# Patient Record
Sex: MALE | Race: WHITE | NOT HISPANIC OR LATINO
[De-identification: names, ages, dates, MRNs, and addresses within clinical notes are randomized per-mention and may not be internally consistent; named-entity substitution may affect disease eponyms.]

---

## 2020-11-29 ENCOUNTER — TRANSCRIPTION ENCOUNTER (OUTPATIENT)
Age: 40
End: 2020-11-29

## 2021-01-20 ENCOUNTER — TRANSCRIPTION ENCOUNTER (OUTPATIENT)
Age: 41
End: 2021-01-20

## 2021-03-15 ENCOUNTER — EMERGENCY (EMERGENCY)
Facility: HOSPITAL | Age: 41
LOS: 1 days | Discharge: ANOTHER HOSPITAL | End: 2021-03-15
Attending: EMERGENCY MEDICINE | Admitting: EMERGENCY MEDICINE
Payer: COMMERCIAL

## 2021-03-15 ENCOUNTER — INPATIENT (INPATIENT)
Facility: HOSPITAL | Age: 41
LOS: 4 days | Discharge: HOME | DRG: 391 | End: 2021-03-20
Attending: INTERNAL MEDICINE | Admitting: INTERNAL MEDICINE
Payer: COMMERCIAL

## 2021-03-15 VITALS
SYSTOLIC BLOOD PRESSURE: 131 MMHG | HEART RATE: 86 BPM | OXYGEN SATURATION: 99 % | TEMPERATURE: 99 F | DIASTOLIC BLOOD PRESSURE: 84 MMHG | RESPIRATION RATE: 14 BRPM

## 2021-03-15 VITALS
RESPIRATION RATE: 18 BRPM | SYSTOLIC BLOOD PRESSURE: 156 MMHG | TEMPERATURE: 97 F | DIASTOLIC BLOOD PRESSURE: 94 MMHG | OXYGEN SATURATION: 96 %

## 2021-03-15 VITALS
HEART RATE: 110 BPM | RESPIRATION RATE: 18 BRPM | SYSTOLIC BLOOD PRESSURE: 127 MMHG | DIASTOLIC BLOOD PRESSURE: 95 MMHG | WEIGHT: 229.94 LBS | TEMPERATURE: 99 F | OXYGEN SATURATION: 96 %

## 2021-03-15 DIAGNOSIS — Z98.890 OTHER SPECIFIED POSTPROCEDURAL STATES: Chronic | ICD-10-CM

## 2021-03-15 DIAGNOSIS — K57.92 DIVERTICULITIS OF INTESTINE, PART UNSPECIFIED, WITHOUT PERFORATION OR ABSCESS WITHOUT BLEEDING: ICD-10-CM

## 2021-03-15 LAB
ALBUMIN SERPL ELPH-MCNC: 4.2 G/DL — SIGNIFICANT CHANGE UP (ref 3.3–5)
ALP SERPL-CCNC: 78 U/L — SIGNIFICANT CHANGE UP (ref 40–120)
ALT FLD-CCNC: 62 U/L — SIGNIFICANT CHANGE UP (ref 12–78)
ANION GAP SERPL CALC-SCNC: 5 MMOL/L — SIGNIFICANT CHANGE UP (ref 5–17)
APPEARANCE UR: ABNORMAL
AST SERPL-CCNC: 22 U/L — SIGNIFICANT CHANGE UP (ref 15–37)
BACTERIA # UR AUTO: ABNORMAL
BASOPHILS # BLD AUTO: 0.06 K/UL — SIGNIFICANT CHANGE UP (ref 0–0.2)
BASOPHILS NFR BLD AUTO: 0.3 % — SIGNIFICANT CHANGE UP (ref 0–2)
BILIRUB SERPL-MCNC: 1.3 MG/DL — HIGH (ref 0.2–1.2)
BILIRUB UR-MCNC: NEGATIVE — SIGNIFICANT CHANGE UP
BUN SERPL-MCNC: 21 MG/DL — SIGNIFICANT CHANGE UP (ref 7–23)
CALCIUM SERPL-MCNC: 9.4 MG/DL — SIGNIFICANT CHANGE UP (ref 8.5–10.1)
CHLORIDE SERPL-SCNC: 105 MMOL/L — SIGNIFICANT CHANGE UP (ref 96–108)
CO2 SERPL-SCNC: 27 MMOL/L — SIGNIFICANT CHANGE UP (ref 22–31)
COLOR SPEC: YELLOW — SIGNIFICANT CHANGE UP
CREAT SERPL-MCNC: 1.2 MG/DL — SIGNIFICANT CHANGE UP (ref 0.5–1.3)
DIFF PNL FLD: ABNORMAL
EOSINOPHIL # BLD AUTO: 0.01 K/UL — SIGNIFICANT CHANGE UP (ref 0–0.5)
EOSINOPHIL NFR BLD AUTO: 0 % — SIGNIFICANT CHANGE UP (ref 0–6)
EPI CELLS # UR: SIGNIFICANT CHANGE UP
GLUCOSE SERPL-MCNC: 118 MG/DL — HIGH (ref 70–99)
GLUCOSE UR QL: NEGATIVE — SIGNIFICANT CHANGE UP
HCT VFR BLD CALC: 46.1 % — SIGNIFICANT CHANGE UP (ref 39–50)
HGB BLD-MCNC: 16.6 G/DL — SIGNIFICANT CHANGE UP (ref 13–17)
IMM GRANULOCYTES NFR BLD AUTO: 0.4 % — SIGNIFICANT CHANGE UP (ref 0–1.5)
KETONES UR-MCNC: NEGATIVE — SIGNIFICANT CHANGE UP
LACTATE SERPL-SCNC: 1.5 MMOL/L — SIGNIFICANT CHANGE UP (ref 0.7–2)
LEUKOCYTE ESTERASE UR-ACNC: NEGATIVE — SIGNIFICANT CHANGE UP
LIDOCAIN IGE QN: 998 U/L — HIGH (ref 73–393)
LYMPHOCYTES # BLD AUTO: 10.4 % — LOW (ref 13–44)
LYMPHOCYTES # BLD AUTO: 2.09 K/UL — SIGNIFICANT CHANGE UP (ref 1–3.3)
MCHC RBC-ENTMCNC: 30.9 PG — SIGNIFICANT CHANGE UP (ref 27–34)
MCHC RBC-ENTMCNC: 36 GM/DL — SIGNIFICANT CHANGE UP (ref 32–36)
MCV RBC AUTO: 85.8 FL — SIGNIFICANT CHANGE UP (ref 80–100)
MONOCYTES # BLD AUTO: 2.04 K/UL — HIGH (ref 0–0.9)
MONOCYTES NFR BLD AUTO: 10.1 % — SIGNIFICANT CHANGE UP (ref 2–14)
NEUTROPHILS # BLD AUTO: 15.89 K/UL — HIGH (ref 1.8–7.4)
NEUTROPHILS NFR BLD AUTO: 78.8 % — HIGH (ref 43–77)
NITRITE UR-MCNC: NEGATIVE — SIGNIFICANT CHANGE UP
NRBC # BLD: 0 /100 WBCS — SIGNIFICANT CHANGE UP (ref 0–0)
PH UR: 6 — SIGNIFICANT CHANGE UP (ref 5–8)
PLATELET # BLD AUTO: 202 K/UL — SIGNIFICANT CHANGE UP (ref 150–400)
POTASSIUM SERPL-MCNC: 3.9 MMOL/L — SIGNIFICANT CHANGE UP (ref 3.5–5.3)
POTASSIUM SERPL-SCNC: 3.9 MMOL/L — SIGNIFICANT CHANGE UP (ref 3.5–5.3)
PROT SERPL-MCNC: 8.3 G/DL — SIGNIFICANT CHANGE UP (ref 6–8.3)
PROT UR-MCNC: 15
RBC # BLD: 5.37 M/UL — SIGNIFICANT CHANGE UP (ref 4.2–5.8)
RBC # FLD: 12.2 % — SIGNIFICANT CHANGE UP (ref 10.3–14.5)
RBC CASTS # UR COMP ASSIST: SIGNIFICANT CHANGE UP /HPF (ref 0–4)
SARS-COV-2 RNA SPEC QL NAA+PROBE: SIGNIFICANT CHANGE UP
SODIUM SERPL-SCNC: 137 MMOL/L — SIGNIFICANT CHANGE UP (ref 135–145)
SP GR SPEC: 1.02 — SIGNIFICANT CHANGE UP (ref 1.01–1.02)
UROBILINOGEN FLD QL: NEGATIVE — SIGNIFICANT CHANGE UP
WBC # BLD: 20.18 K/UL — HIGH (ref 3.8–10.5)
WBC # FLD AUTO: 20.18 K/UL — HIGH (ref 3.8–10.5)
WBC UR QL: SIGNIFICANT CHANGE UP

## 2021-03-15 PROCEDURE — 83690 ASSAY OF LIPASE: CPT

## 2021-03-15 PROCEDURE — 93010 ELECTROCARDIOGRAM REPORT: CPT

## 2021-03-15 PROCEDURE — G1004: CPT

## 2021-03-15 PROCEDURE — U0005: CPT

## 2021-03-15 PROCEDURE — 74177 CT ABD & PELVIS W/CONTRAST: CPT

## 2021-03-15 PROCEDURE — 80053 COMPREHEN METABOLIC PANEL: CPT

## 2021-03-15 PROCEDURE — 85025 COMPLETE CBC W/AUTO DIFF WBC: CPT

## 2021-03-15 PROCEDURE — 76870 US EXAM SCROTUM: CPT

## 2021-03-15 PROCEDURE — 36415 COLL VENOUS BLD VENIPUNCTURE: CPT

## 2021-03-15 PROCEDURE — 96365 THER/PROPH/DIAG IV INF INIT: CPT | Mod: XU

## 2021-03-15 PROCEDURE — 87086 URINE CULTURE/COLONY COUNT: CPT

## 2021-03-15 PROCEDURE — 83605 ASSAY OF LACTIC ACID: CPT

## 2021-03-15 PROCEDURE — 74177 CT ABD & PELVIS W/CONTRAST: CPT | Mod: 26,MG

## 2021-03-15 PROCEDURE — 81001 URINALYSIS AUTO W/SCOPE: CPT

## 2021-03-15 PROCEDURE — 96361 HYDRATE IV INFUSION ADD-ON: CPT

## 2021-03-15 PROCEDURE — 93975 VASCULAR STUDY: CPT | Mod: 26

## 2021-03-15 PROCEDURE — 93975 VASCULAR STUDY: CPT

## 2021-03-15 PROCEDURE — 99285 EMERGENCY DEPT VISIT HI MDM: CPT

## 2021-03-15 PROCEDURE — 76870 US EXAM SCROTUM: CPT | Mod: 26

## 2021-03-15 PROCEDURE — U0003: CPT

## 2021-03-15 PROCEDURE — 99285 EMERGENCY DEPT VISIT HI MDM: CPT | Mod: 25

## 2021-03-15 RX ORDER — ACETAMINOPHEN 500 MG
650 TABLET ORAL ONCE
Refills: 0 | Status: COMPLETED | OUTPATIENT
Start: 2021-03-15 | End: 2021-03-15

## 2021-03-15 RX ORDER — SODIUM CHLORIDE 9 MG/ML
1000 INJECTION INTRAMUSCULAR; INTRAVENOUS; SUBCUTANEOUS ONCE
Refills: 0 | Status: COMPLETED | OUTPATIENT
Start: 2021-03-15 | End: 2021-03-15

## 2021-03-15 RX ORDER — INFLUENZA VIRUS VACCINE 15; 15; 15; 15 UG/.5ML; UG/.5ML; UG/.5ML; UG/.5ML
0.5 SUSPENSION INTRAMUSCULAR ONCE
Refills: 0 | Status: DISCONTINUED | OUTPATIENT
Start: 2021-03-15 | End: 2021-03-20

## 2021-03-15 RX ORDER — PIPERACILLIN AND TAZOBACTAM 4; .5 G/20ML; G/20ML
3.38 INJECTION, POWDER, LYOPHILIZED, FOR SOLUTION INTRAVENOUS ONCE
Refills: 0 | Status: COMPLETED | OUTPATIENT
Start: 2021-03-15 | End: 2021-03-15

## 2021-03-15 RX ADMIN — PIPERACILLIN AND TAZOBACTAM 200 GRAM(S): 4; .5 INJECTION, POWDER, LYOPHILIZED, FOR SOLUTION INTRAVENOUS at 11:40

## 2021-03-15 RX ADMIN — PIPERACILLIN AND TAZOBACTAM 3.38 GRAM(S): 4; .5 INJECTION, POWDER, LYOPHILIZED, FOR SOLUTION INTRAVENOUS at 12:30

## 2021-03-15 RX ADMIN — SODIUM CHLORIDE 1000 MILLILITER(S): 9 INJECTION INTRAMUSCULAR; INTRAVENOUS; SUBCUTANEOUS at 09:06

## 2021-03-15 RX ADMIN — SODIUM CHLORIDE 1000 MILLILITER(S): 9 INJECTION INTRAMUSCULAR; INTRAVENOUS; SUBCUTANEOUS at 11:25

## 2021-03-15 RX ADMIN — Medication 650 MILLIGRAM(S): at 17:21

## 2021-03-15 NOTE — ED PROVIDER NOTE - OBJECTIVE STATEMENT
39 yo M p/w LLQ abd pain x past ~ 2 days. Pt states rad to L flank and to L testicle . No fever/chills. No v/d. No dysuria / hematuria. no neck / back pain. no recent trauma. no agg/allev factors. No other inj or co. No known covid exposures / prior infxn / immunization. No other inj or co.

## 2021-03-15 NOTE — ED ADULT NURSE NOTE - OBJECTIVE STATEMENT
Pt p/w LLQ abdominal pain radiating to groin x 1 day and nausea today. Pt reports heavy lifting believed it to be a strain but noted that pain was unrelenting and not improved w/ stretching. denies any change in bowel movement, no fever, or vomiting. Abdomen soft non tender. No change in urination.

## 2021-03-15 NOTE — ED ADULT NURSE REASSESSMENT NOTE - NSIMPLEMENTINTERV_GEN_ALL_ED
Implemented All Universal Safety Interventions:  [place] to call system. Call bell, personal items and telephone within reach. Instruct patient to call for assistance. Room bathroom lighting operational. Non-slip footwear when patient is off stretcher. Physically safe environment: no spills, clutter or unnecessary equipment. Stretcher in lowest position, wheels locked, appropriate side rails in place.

## 2021-03-15 NOTE — ED PROVIDER NOTE - PROGRESS NOTE DETAILS
Dw pt re need for admission, IV abx  Kvng Mclaughlin - will see pt to admit at [facility] pending COVID.

## 2021-03-15 NOTE — ED ADULT NURSE REASSESSMENT NOTE - NS ED NURSE REASSESS COMMENT FT1
received report from EDDIE Raman.  vss afebrile denies pain.  pt expressed feeling frustrated that he has been here all day.  pt hoped to have been in [place] by now.  awaiting transport to [place]. report given by prior shift,  plan to go ER to receiving unit.

## 2021-03-15 NOTE — ED PROVIDER NOTE - CHPI ED SYMPTOMS NEG
no abdominal distension/no blood in stool/no diarrhea/no dysuria/no fever/no vomiting/no burning urination/no chills

## 2021-03-15 NOTE — ED PROVIDER NOTE - GENITOURINARY, MLM
No discharge, lesions. nl cremesteric bl. Pos tend to L scrotal insertion - no bulges / hernias palpated. Nl penis.

## 2021-03-16 DIAGNOSIS — D72.829 ELEVATED WHITE BLOOD CELL COUNT, UNSPECIFIED: ICD-10-CM

## 2021-03-16 DIAGNOSIS — K57.32 DIVERTICULITIS OF LARGE INTESTINE WITHOUT PERFORATION OR ABSCESS WITHOUT BLEEDING: ICD-10-CM

## 2021-03-16 DIAGNOSIS — K85.90 ACUTE PANCREATITIS WITHOUT NECROSIS OR INFECTION, UNSPECIFIED: ICD-10-CM

## 2021-03-16 DIAGNOSIS — Z29.9 ENCOUNTER FOR PROPHYLACTIC MEASURES, UNSPECIFIED: ICD-10-CM

## 2021-03-16 DIAGNOSIS — E66.9 OBESITY, UNSPECIFIED: ICD-10-CM

## 2021-03-16 PROBLEM — N20.0 CALCULUS OF KIDNEY: Chronic | Status: ACTIVE | Noted: 2021-03-15

## 2021-03-16 LAB
AMYLASE P1 CFR SERPL: 94 U/L — SIGNIFICANT CHANGE UP (ref 25–125)
ANION GAP SERPL CALC-SCNC: 9 MMOL/L — SIGNIFICANT CHANGE UP (ref 5–17)
BASOPHILS # BLD AUTO: 0.03 K/UL — SIGNIFICANT CHANGE UP (ref 0–0.2)
BASOPHILS NFR BLD AUTO: 0.2 % — SIGNIFICANT CHANGE UP (ref 0–2)
BUN SERPL-MCNC: 20 MG/DL — SIGNIFICANT CHANGE UP (ref 7–23)
CALCIUM SERPL-MCNC: 9.3 MG/DL — SIGNIFICANT CHANGE UP (ref 8.4–10.5)
CHLORIDE SERPL-SCNC: 103 MMOL/L — SIGNIFICANT CHANGE UP (ref 96–108)
CO2 SERPL-SCNC: 26 MMOL/L — SIGNIFICANT CHANGE UP (ref 22–31)
CREAT SERPL-MCNC: 0.86 MG/DL — SIGNIFICANT CHANGE UP (ref 0.5–1.3)
CULTURE RESULTS: SIGNIFICANT CHANGE UP
EOSINOPHIL # BLD AUTO: 0.06 K/UL — SIGNIFICANT CHANGE UP (ref 0–0.5)
EOSINOPHIL NFR BLD AUTO: 0.4 % — SIGNIFICANT CHANGE UP (ref 0–6)
GLUCOSE SERPL-MCNC: 110 MG/DL — HIGH (ref 70–99)
HCT VFR BLD CALC: 41.3 % — SIGNIFICANT CHANGE UP (ref 39–50)
HGB BLD-MCNC: 14.4 G/DL — SIGNIFICANT CHANGE UP (ref 13–17)
IMM GRANULOCYTES NFR BLD AUTO: 0.5 % — SIGNIFICANT CHANGE UP (ref 0–1.5)
LIDOCAIN IGE QN: 804 U/L — HIGH (ref 73–393)
LYMPHOCYTES # BLD AUTO: 1.57 K/UL — SIGNIFICANT CHANGE UP (ref 1–3.3)
LYMPHOCYTES # BLD AUTO: 11.3 % — LOW (ref 13–44)
MCHC RBC-ENTMCNC: 30.7 PG — SIGNIFICANT CHANGE UP (ref 27–34)
MCHC RBC-ENTMCNC: 34.9 GM/DL — SIGNIFICANT CHANGE UP (ref 32–36)
MCV RBC AUTO: 88.1 FL — SIGNIFICANT CHANGE UP (ref 80–100)
MONOCYTES # BLD AUTO: 1.45 K/UL — HIGH (ref 0–0.9)
MONOCYTES NFR BLD AUTO: 10.4 % — SIGNIFICANT CHANGE UP (ref 2–14)
NEUTROPHILS # BLD AUTO: 10.72 K/UL — HIGH (ref 1.8–7.4)
NEUTROPHILS NFR BLD AUTO: 77.2 % — HIGH (ref 43–77)
NRBC # BLD: 0 /100 WBCS — SIGNIFICANT CHANGE UP (ref 0–0)
PLATELET # BLD AUTO: 163 K/UL — SIGNIFICANT CHANGE UP (ref 150–400)
POTASSIUM SERPL-MCNC: 3.6 MMOL/L — SIGNIFICANT CHANGE UP (ref 3.5–5.3)
POTASSIUM SERPL-SCNC: 3.6 MMOL/L — SIGNIFICANT CHANGE UP (ref 3.5–5.3)
RBC # BLD: 4.69 M/UL — SIGNIFICANT CHANGE UP (ref 4.2–5.8)
RBC # FLD: 12.3 % — SIGNIFICANT CHANGE UP (ref 10.3–14.5)
SARS-COV-2 IGG SERPL QL IA: NEGATIVE — SIGNIFICANT CHANGE UP
SARS-COV-2 IGM SERPL IA-ACNC: 0.07 INDEX — SIGNIFICANT CHANGE UP
SODIUM SERPL-SCNC: 138 MMOL/L — SIGNIFICANT CHANGE UP (ref 135–145)
SPECIMEN SOURCE: SIGNIFICANT CHANGE UP
WBC # BLD: 13.9 K/UL — HIGH (ref 3.8–10.5)
WBC # FLD AUTO: 13.9 K/UL — HIGH (ref 3.8–10.5)

## 2021-03-16 PROCEDURE — 99233 SBSQ HOSP IP/OBS HIGH 50: CPT

## 2021-03-16 PROCEDURE — 99223 1ST HOSP IP/OBS HIGH 75: CPT

## 2021-03-16 PROCEDURE — 93010 ELECTROCARDIOGRAM REPORT: CPT

## 2021-03-16 RX ORDER — ENOXAPARIN SODIUM 100 MG/ML
40 INJECTION SUBCUTANEOUS DAILY
Refills: 0 | Status: DISCONTINUED | OUTPATIENT
Start: 2021-03-16 | End: 2021-03-20

## 2021-03-16 RX ORDER — PIPERACILLIN AND TAZOBACTAM 4; .5 G/20ML; G/20ML
3.38 INJECTION, POWDER, LYOPHILIZED, FOR SOLUTION INTRAVENOUS EVERY 8 HOURS
Refills: 0 | Status: DISCONTINUED | OUTPATIENT
Start: 2021-03-16 | End: 2021-03-16

## 2021-03-16 RX ORDER — PIPERACILLIN AND TAZOBACTAM 4; .5 G/20ML; G/20ML
3.38 INJECTION, POWDER, LYOPHILIZED, FOR SOLUTION INTRAVENOUS EVERY 8 HOURS
Refills: 0 | Status: DISCONTINUED | OUTPATIENT
Start: 2021-03-16 | End: 2021-03-20

## 2021-03-16 RX ORDER — SODIUM CHLORIDE 9 MG/ML
1000 INJECTION, SOLUTION INTRAVENOUS ONCE
Refills: 0 | Status: COMPLETED | OUTPATIENT
Start: 2021-03-16 | End: 2021-03-16

## 2021-03-16 RX ORDER — SODIUM CHLORIDE 9 MG/ML
1000 INJECTION, SOLUTION INTRAVENOUS
Refills: 0 | Status: DISCONTINUED | OUTPATIENT
Start: 2021-03-16 | End: 2021-03-17

## 2021-03-16 RX ORDER — SODIUM CHLORIDE 9 MG/ML
1000 INJECTION, SOLUTION INTRAVENOUS
Refills: 0 | Status: DISCONTINUED | OUTPATIENT
Start: 2021-03-16 | End: 2021-03-16

## 2021-03-16 RX ORDER — ACETAMINOPHEN 500 MG
650 TABLET ORAL EVERY 6 HOURS
Refills: 0 | Status: DISCONTINUED | OUTPATIENT
Start: 2021-03-16 | End: 2021-03-20

## 2021-03-16 RX ORDER — PIPERACILLIN AND TAZOBACTAM 4; .5 G/20ML; G/20ML
3.38 INJECTION, POWDER, LYOPHILIZED, FOR SOLUTION INTRAVENOUS ONCE
Refills: 0 | Status: DISCONTINUED | OUTPATIENT
Start: 2021-03-16 | End: 2021-03-16

## 2021-03-16 RX ADMIN — PIPERACILLIN AND TAZOBACTAM 25 GRAM(S): 4; .5 INJECTION, POWDER, LYOPHILIZED, FOR SOLUTION INTRAVENOUS at 00:41

## 2021-03-16 RX ADMIN — SODIUM CHLORIDE 1000 MILLILITER(S): 9 INJECTION, SOLUTION INTRAVENOUS at 05:39

## 2021-03-16 RX ADMIN — SODIUM CHLORIDE 150 MILLILITER(S): 9 INJECTION, SOLUTION INTRAVENOUS at 06:46

## 2021-03-16 RX ADMIN — SODIUM CHLORIDE 150 MILLILITER(S): 9 INJECTION, SOLUTION INTRAVENOUS at 12:18

## 2021-03-16 RX ADMIN — Medication 650 MILLIGRAM(S): at 20:04

## 2021-03-16 RX ADMIN — SODIUM CHLORIDE 200 MILLILITER(S): 9 INJECTION, SOLUTION INTRAVENOUS at 23:41

## 2021-03-16 RX ADMIN — SODIUM CHLORIDE 200 MILLILITER(S): 9 INJECTION, SOLUTION INTRAVENOUS at 15:00

## 2021-03-16 RX ADMIN — PIPERACILLIN AND TAZOBACTAM 25 GRAM(S): 4; .5 INJECTION, POWDER, LYOPHILIZED, FOR SOLUTION INTRAVENOUS at 08:31

## 2021-03-16 RX ADMIN — SODIUM CHLORIDE 1000 MILLILITER(S): 9 INJECTION, SOLUTION INTRAVENOUS at 00:40

## 2021-03-16 RX ADMIN — PIPERACILLIN AND TAZOBACTAM 25 GRAM(S): 4; .5 INJECTION, POWDER, LYOPHILIZED, FOR SOLUTION INTRAVENOUS at 16:27

## 2021-03-16 RX ADMIN — PIPERACILLIN AND TAZOBACTAM 25 GRAM(S): 4; .5 INJECTION, POWDER, LYOPHILIZED, FOR SOLUTION INTRAVENOUS at 23:41

## 2021-03-16 RX ADMIN — ENOXAPARIN SODIUM 40 MILLIGRAM(S): 100 INJECTION SUBCUTANEOUS at 12:16

## 2021-03-16 RX ADMIN — Medication 650 MILLIGRAM(S): at 20:34

## 2021-03-16 NOTE — CHART NOTE - NSCHARTNOTEFT_GEN_A_CORE
Patient seen and examined at bedside. H and P reviewed. Agree with the above with the following addenda/modifications    Patient reports abdomen pain is improved still slightly uncomfortable  seen by GI    plan:  advance to full  c/w IV antibiotics Patient seen and examined at bedside. H and P reviewed. Agree with the above with the following addenda/modifications    Patient reports abdomen pain is improved still slightly uncomfortable  seen by GI    plan:  advance to full  c/w IV antibiotics      addenda  patient has fever 101 likely due to pancreatitis  check blood cx check urine cx    also noted episodes of sinus tachy. c/w monitoring

## 2021-03-16 NOTE — H&P ADULT - PROBLEM SELECTOR PLAN 2
ML related to the above, no fever, normal lactic acid level, was tachy only on triage,  received a total of 3000 ml IVF bolus, already on zosyn, monitor I & O, and trend TLC.

## 2021-03-16 NOTE — H&P ADULT - NSHPREVIEWOFSYSTEMS_GEN_ALL_CORE
-    CONSTITUTIONAL: No fever or chills.  EYES: No eye pain, visual disturbances, or discharge.  ENMT:  No difficulty hearing, vertigo, sinus or throat pain.  NECK: No pain or stiffness.	  RESPIRATORY: No cough, wheezing, or hemoptysis; No shortness of breath.  CARDIOVASCULAR: No chest pain, palpitations, dizziness, or leg swelling.  GASTROINTESTINAL: (+) abdominal pain, no nausea, vomiting, or hematemesis; No diarrhea or Change in bowel habits. No melena or hematochezia.  GENITOURINARY: No dysuria, frequency, hematuria, or incontinence.  NEUROLOGICAL: No headaches, focal muscle weakness, numbness, or tremors.  SKIN: No itching, burning or rashes.  MUSCULOSKELETAL: No joint swelling or pain.  PSYCHIATRIC: No depression, anxiety, or agitation.  HEME/LYMPH: No easy bruising, bleeding gums, or nose bleed.  ALLERGY AND IMMUNOLOGIC: No hives or eczema. -    CONSTITUTIONAL: No fever or chills.  EYES: No eye pain, visual disturbances, or discharge.  ENMT:  No difficulty hearing, vertigo, sinus or throat pain.  NECK: No pain or stiffness.	  RESPIRATORY: No cough, wheezing, or hemoptysis; No shortness of breath.  CARDIOVASCULAR: No chest pain, palpitations, dizziness, or leg swelling.  GASTROINTESTINAL: (+) abdominal pain but not currently, no nausea, vomiting, or hematemesis; No diarrhea or Change in bowel habits. No melena or hematochezia.  GENITOURINARY: No dysuria, frequency, hematuria, or incontinence.  NEUROLOGICAL: No headaches, focal muscle weakness, numbness, or tremors.  SKIN: No itching, burning or rashes.  MUSCULOSKELETAL: No joint swelling or pain.  PSYCHIATRIC: No depression, anxiety, or agitation.  HEME/LYMPH: No easy bruising, bleeding gums, or nose bleed.  ALLERGY AND IMMUNOLOGIC: No hives or eczema.

## 2021-03-16 NOTE — CONSULT NOTE ADULT - SUBJECTIVE AND OBJECTIVE BOX
[facility]    MD Wayne Bernabe MD Jaydeep Kadam, MD Faisal Sheikh, DO Kostas Sideridis, DO    Chief Complaint:  Patient is a 40y old  Male who presents with a chief complaint of LLQ abdominal pain. (16 Mar 2021 01:31)      HPI:  40-year-old male with no significant past medical history with 3 days of left lower quadrant abdominal pain, antecedent event was having multiple beers over the weekend.  Patient then developed left lower quadrant pulling sensation which he attributed secondary to lifting heavy boxes at his job.  Yesterday, pain became significantly worse and he came to the emergency room.    The patient denies any nausea/vomiting/chest pain/heartburn.  He denies having any diarrhea.  His last bowel movement was .  He denies any fevers/chills at home.    Patient was found to have leukocytosis of 20 on admission, CT abdomen pelvis demonstrates acute sigmoid diverticulitis.  Patient has never had a colonoscopy in the past.  He has never had an episode of diverticulitis in the past.  Family history significant for colon cancer in a maternal aunt.    Allergies:  No Known Allergies      Home Medications:    Hospital Medications:  enoxaparin Injectable 40 milliGRAM(s) SubCutaneous daily  influenza   Vaccine 0.5 milliLiter(s) IntraMuscular once  lactated ringers. 1000 milliLiter(s) IV Continuous <Continuous>  piperacillin/tazobactam IVPB.. 3.375 Gram(s) IV Intermittent every 8 hours      PMHX/PSHX:  Kidney stones    H/O hernia repair      Family history:  No pertinent family history in first degree relatives  Positive colon cancer in a maternal aunt      Social History:   Social alcohol use    ROS:     General:  No wt loss, fevers, chills, night sweats, fatigue,   Eyes:  Good vision, no reported pain  ENT:  No sore throat, pain, runny nose, dysphagia  CV:  No pain, palpitations, hypo/hypertension  Resp:  No dyspnea, cough, tachypnea, wheezing  GI:  See HPI  :  No pain, bleeding, incontinence, nocturia  Muscle:  No pain, weakness  Neuro:  No weakness, tingling, memory problems  Psych:  No fatigue, insomnia, mood problems, depression  Endocrine:  No polyuria, polydipsia, cold/heat intolerance  Heme:  No petechiae, ecchymosis, easy bruisability  Skin:  No rash, tattoos, scars, edema      PHYSICAL EXAM:     GENERAL:  Appears stated age, well-groomed, well-nourished, no distress  HEENT:  NC/AT,  conjunctivae clear and pink, no thyromegaly, nodules, adenopathy, no JVD, sclera -anicteric  CHEST:  Full & symmetric excursion, no increased effort, breath sounds clear  HEART:  Regular rhythm, S1, S2, no murmur/rub/S3/S4, no abdominal bruit, no edema  ABDOMEN:  Soft, Minimal tenderness to the left lower quadrant with deep palpation, no rebound, no guarding  EXT:  no cyanosis,clubbing or edema  SKIN:  No rash/erythema, intact   NEURO:  Alert, oriented, no asterixis, no tremor, no encephalopathy    Vital Signs:  Vital Signs Last 24 Hrs  T(C): 37.8 (16 Mar 2021 10:00), Max: 37.9 (15 Mar 2021 14:07)  T(F): 100 (16 Mar 2021 10:00), Max: 100.2 (15 Mar 2021 14:07)  HR: 95 (16 Mar 2021 10:00) (81 - 99)  BP: 133/91 (16 Mar 2021 10:00) (112/69 - 156/94)  BP(mean): --  RR: 17 (16 Mar 2021 10:00) (14 - 19)  SpO2: 95% (16 Mar 2021 10:00) (95% - 99%)  Daily     Daily     LABS:                        14.4   13.90 )-----------( 163      ( 16 Mar 2021 07:57 )             41.3     Mean Cell Volume: 88.1 fl (21 @ 07:57)    -    138  |  103  |  20  ----------------------------<  110<H>  3.6   |  26  |  0.86    Ca    9.3      16 Mar 2021 07:57    TPro  8.3  /  Alb  4.2  /  TBili  1.3<H>  /  DBili  x   /  AST  22  /  ALT  62  /  AlkPhos  78  03-15    LIVER FUNCTIONS - ( 15 Mar 2021 09:12 )  Alb: 4.2 g/dL / Pro: 8.3 g/dL / ALK PHOS: 78 U/L / ALT: 62 U/L / AST: 22 U/L / GGT: x             Urinalysis Basic - ( 15 Mar 2021 09:23 )    Color: Yellow / Appearance: Slightly Turbid / S.020 / pH: x  Gluc: x / Ketone: Negative  / Bili: Negative / Urobili: Negative   Blood: x / Protein: 15 / Nitrite: Negative   Leuk Esterase: Negative / RBC: 0-2 /HPF / WBC 0-2   Sq Epi: x / Non Sq Epi: Occasional / Bacteria: Occasional      Amylase [id number]      Lipase [id number]       Ammonia--                          14.4   13.90 )-----------( 163      ( 16 Mar 2021 07:57 )             41.3                         16.6   20.18 )-----------( 202      ( 15 Mar 2021 09:12 )             46.1       Imaging:

## 2021-03-16 NOTE — H&P ADULT - PROBLEM SELECTOR PLAN 3
possibly resolving given the mild elevation, unclear cause, trend lipase, GI consult as stated above.

## 2021-03-16 NOTE — H&P ADULT - HISTORY OF PRESENT ILLNESS
This is a 41 y/o M with PMH of Nephrolithiasis s/p Lithotripsy who presented with 2/10 vague LLQ abdominal pain, started on Friday, radiating to his left testicle and up to his left loin, no associated nausea vomiting, or diarrhea, no reported blood in stool. Patient was concerned despite mild nature of symptoms as he thought there is another kidney stone, so came to the ED. CT abdomen with IV contrast showed evidence of acute sigmoid diverticulitis. Denies any fever or chills. This is a 39 y/o M with PMH of Nephrolithiasis s/p Lithotripsy and Obesity who presented with 2/10 vague LLQ abdominal pain, started on Friday, radiating to his left testicle and up to his left loin, no associated nausea vomiting, or diarrhea, no reported blood in stool. Patient was concerned despite mild nature of symptoms as he thought there is another kidney stone, so came to the ED. CT abdomen with IV contrast showed evidence of acute sigmoid diverticulitis. Denies any fever or chills.

## 2021-03-16 NOTE — H&P ADULT - NSHPPHYSICALEXAM_GEN_ALL_CORE
-    Vital Signs Last 24 Hrs  T(C): 37.1 (16 Mar 2021 01:33), Max: 37.9 (15 Mar 2021 14:07)  T(F): 98.8 (16 Mar 2021 01:33), Max: 100.2 (15 Mar 2021 14:07)  HR: 95 (16 Mar 2021 01:33) (81 - 110)  BP: 112/69 (16 Mar 2021 01:33) (112/69 - 156/94)  BP(mean): --  RR: 18 (16 Mar 2021 01:33) (14 - 19)  SpO2: 96% (16 Mar 2021 01:33) (96% - 99%)          PHYSICAL EXAM:  		  GENERAL: NAD, well-groomed, well-developed, obese.  HEAD:  Atraumatic, Norm cephalic.  EYES: PERRLA, conjunctiva clear.  ENMT: no nasal discharge, no jez-pharyngeal erythema or exudates, MMM.   NECK: Supple, No JVD.  NERVOUS SYSTEM:  Alert & oriented X3, neurologically intact grossly.  CHEST/LUNG: Good air entry B/L, no rales, rhonchi, or wheezing.  HEART: Normal S1 & S2, no murmurs, or extra sounds.  ABDOMEN: Soft, currently non-tender, non-distended; bowel sounds present, no palpable masses or organomegaly.  EXTREMITIES:  No clubbing, cyanosis, or edema.  VASCULAR: 2+ radial, DPA / PTA pulses B/L.  SKIN: No rashes or lesions.  PSYCH: normal affect & behavior.

## 2021-03-16 NOTE — CONSULT NOTE ADULT - ASSESSMENT
40-year-old male with left lower quadrant pain found to have uncomplicated acute proximal sigmoid diverticulitis, first episode.  1.  Continue IV antibiotics  2.  Advance diet to full liquids As tolerated  3.  DVT prophylaxis  4.  Discussed findings with patient, patient will need to have colonoscopy in 6 weeks as outpatient.  Patient ready has an appointment with GI doctor this month, Dr. Edson Betancur.   5.  Discussed condition with wife, Lucila, as well as the need for follow-up in 4-6 weeks with colonoscopy.    Jazmin Maurice MD  Gastroenterology  [address] [address] [phone]    ACP (advance care planning). Plan: Advanced care planning was discussed with patient and family.  Advanced care planning forms were reviewed and discussed.  Risks, benefits and alternatives of gastroenterologic procedures were discussed in detail and all questions were answered.    30 minutes spent.

## 2021-03-16 NOTE — H&P ADULT - PROBLEM SELECTOR PLAN 1
shown in CT abdomen & pelvis, also scrotal ultrasound was ruled out testicular torsion given the presentation, admitted to telemetry, clear liquid diet, pain control, Zosyn 3.375 gm Q 8h, trend TLC, GI consult was called.

## 2021-03-16 NOTE — H&P ADULT - NSHPLABSRESULTS_GEN_ALL_CORE
-    03-15    137  |  105  |  21  ----------------------------<  118<H>  3.9   |  27  |  1.20    Ca    9.4      15 Mar 2021 09:12    TPro  8.3  /  Alb  4.2  /  TBili  1.3<H>  /  DBili  x   /  AST  22  /  ALT  62  /  AlkPhos  78  03-15                          16.6   20.18 )-----------( 202      ( 15 Mar 2021 09:12 )             46.1         LIVER FUNCTIONS - ( 15 Mar 2021 09:12 )  Alb: 4.2 g/dL / Pro: 8.3 g/dL / ALK PHOS: 78 U/L / ALT: 62 U/L / AST: 22 U/L / GGT: x             Urinalysis Basic - ( 15 Mar 2021 09:23 )    Color: Yellow / Appearance: Slightly Turbid / S.020 / pH: x  Gluc: x / Ketone: Negative  / Bili: Negative / Urobili: Negative   Blood: x / Protein: 15 / Nitrite: Negative   Leuk Esterase: Negative / RBC: 0-2 /HPF / WBC 0-2   Sq Epi: x / Non Sq Epi: Occasional / Bacteria: Occasional        US SCROTUM AND CONTENTS                        EXAM:  US DPLX SCROTUM                        PROCEDURE DATE:  03/15/2021    INTERPRETATION:  CLINICAL INFORMATION: Left testicular pain  COMPARISON: None available.  TECHNIQUE: Testicular ultrasound utilizing color and spectral Doppler.  FINDINGS:  RIGHT:  Right testis: 4.1 cm x 2.1 cm x  3.8 cm. Normal echogenicity and echotexture with no masses or areas of architectural distortion. Normal arterial and venous blood flowpattern.  Right epididymis: Within normal limits.  Right hydrocele: Trace.  Right varicocele: Present.  LEFT:  Left testis: 4.4 cm x 2.4 cm x 3.2 cm. Normal echogenicity and echotexture with no masses or areas of architectural distortion. Normal arterial and venous blood flow pattern.  Left epididymis: Within normal limits.  Left hydrocele: Trace.  Left varicocele: Present.  IMPRESSION:  No testicular torsion.  Bilateral varicoceles.         CT ABDOMEN AND PELVIS IC                        PROCEDURE DATE:  03/15/2021    Acute sigmoid diverticulitis.  Personally reviewed by me.        EKG:    As per my review shows SR at 95/min, normal ID & QTc intervals, normal QRS voltage, duration, and axis (- 15), with normal transition, no ST-T abnormality.      -

## 2021-03-17 LAB
ANION GAP SERPL CALC-SCNC: 9 MMOL/L — SIGNIFICANT CHANGE UP (ref 5–17)
APPEARANCE UR: CLEAR — SIGNIFICANT CHANGE UP
BASOPHILS # BLD AUTO: 0.05 K/UL — SIGNIFICANT CHANGE UP (ref 0–0.2)
BASOPHILS NFR BLD AUTO: 0.5 % — SIGNIFICANT CHANGE UP (ref 0–2)
BILIRUB UR-MCNC: NEGATIVE — SIGNIFICANT CHANGE UP
BUN SERPL-MCNC: 14 MG/DL — SIGNIFICANT CHANGE UP (ref 7–23)
C DIFF BY PCR RESULT: DETECTED
C DIFF TOX GENS STL QL NAA+PROBE: SIGNIFICANT CHANGE UP
CALCIUM SERPL-MCNC: 10 MG/DL — SIGNIFICANT CHANGE UP (ref 8.4–10.5)
CHLORIDE SERPL-SCNC: 104 MMOL/L — SIGNIFICANT CHANGE UP (ref 96–108)
CO2 SERPL-SCNC: 26 MMOL/L — SIGNIFICANT CHANGE UP (ref 22–31)
COLOR SPEC: YELLOW — SIGNIFICANT CHANGE UP
CREAT SERPL-MCNC: 0.95 MG/DL — SIGNIFICANT CHANGE UP (ref 0.5–1.3)
CULTURE RESULTS: SIGNIFICANT CHANGE UP
DIFF PNL FLD: NEGATIVE — SIGNIFICANT CHANGE UP
EOSINOPHIL # BLD AUTO: 0.25 K/UL — SIGNIFICANT CHANGE UP (ref 0–0.5)
EOSINOPHIL NFR BLD AUTO: 2.4 % — SIGNIFICANT CHANGE UP (ref 0–6)
GLUCOSE SERPL-MCNC: 116 MG/DL — HIGH (ref 70–99)
GLUCOSE UR QL: NEGATIVE MG/DL — SIGNIFICANT CHANGE UP
HCT VFR BLD CALC: 43.9 % — SIGNIFICANT CHANGE UP (ref 39–50)
HGB BLD-MCNC: 15.3 G/DL — SIGNIFICANT CHANGE UP (ref 13–17)
IMM GRANULOCYTES NFR BLD AUTO: 0.5 % — SIGNIFICANT CHANGE UP (ref 0–1.5)
KETONES UR-MCNC: NEGATIVE — SIGNIFICANT CHANGE UP
LEUKOCYTE ESTERASE UR-ACNC: NEGATIVE — SIGNIFICANT CHANGE UP
LYMPHOCYTES # BLD AUTO: 2.22 K/UL — SIGNIFICANT CHANGE UP (ref 1–3.3)
LYMPHOCYTES # BLD AUTO: 20.9 % — SIGNIFICANT CHANGE UP (ref 13–44)
MAGNESIUM SERPL-MCNC: 2 MG/DL — SIGNIFICANT CHANGE UP (ref 1.6–2.6)
MCHC RBC-ENTMCNC: 30.5 PG — SIGNIFICANT CHANGE UP (ref 27–34)
MCHC RBC-ENTMCNC: 34.9 GM/DL — SIGNIFICANT CHANGE UP (ref 32–36)
MCV RBC AUTO: 87.5 FL — SIGNIFICANT CHANGE UP (ref 80–100)
MONOCYTES # BLD AUTO: 1.17 K/UL — HIGH (ref 0–0.9)
MONOCYTES NFR BLD AUTO: 11 % — SIGNIFICANT CHANGE UP (ref 2–14)
NEUTROPHILS # BLD AUTO: 6.87 K/UL — SIGNIFICANT CHANGE UP (ref 1.8–7.4)
NEUTROPHILS NFR BLD AUTO: 64.7 % — SIGNIFICANT CHANGE UP (ref 43–77)
NITRITE UR-MCNC: NEGATIVE — SIGNIFICANT CHANGE UP
NRBC # BLD: 0 /100 WBCS — SIGNIFICANT CHANGE UP (ref 0–0)
PH UR: 8 — SIGNIFICANT CHANGE UP (ref 5–8)
PHOSPHATE SERPL-MCNC: 2.9 MG/DL — SIGNIFICANT CHANGE UP (ref 2.5–4.5)
PLATELET # BLD AUTO: 198 K/UL — SIGNIFICANT CHANGE UP (ref 150–400)
POTASSIUM SERPL-MCNC: 3.7 MMOL/L — SIGNIFICANT CHANGE UP (ref 3.5–5.3)
POTASSIUM SERPL-SCNC: 3.7 MMOL/L — SIGNIFICANT CHANGE UP (ref 3.5–5.3)
PROT UR-MCNC: NEGATIVE MG/DL — SIGNIFICANT CHANGE UP
RBC # BLD: 5.02 M/UL — SIGNIFICANT CHANGE UP (ref 4.2–5.8)
RBC # FLD: 12.1 % — SIGNIFICANT CHANGE UP (ref 10.3–14.5)
SODIUM SERPL-SCNC: 139 MMOL/L — SIGNIFICANT CHANGE UP (ref 135–145)
SP GR SPEC: 1.01 — SIGNIFICANT CHANGE UP (ref 1.01–1.02)
SPECIMEN SOURCE: SIGNIFICANT CHANGE UP
UROBILINOGEN FLD QL: NEGATIVE MG/DL — SIGNIFICANT CHANGE UP
WBC # BLD: 10.61 K/UL — HIGH (ref 3.8–10.5)
WBC # FLD AUTO: 10.61 K/UL — HIGH (ref 3.8–10.5)

## 2021-03-17 PROCEDURE — 99233 SBSQ HOSP IP/OBS HIGH 50: CPT

## 2021-03-17 RX ORDER — SODIUM CHLORIDE 9 MG/ML
1000 INJECTION, SOLUTION INTRAVENOUS
Refills: 0 | Status: DISCONTINUED | OUTPATIENT
Start: 2021-03-17 | End: 2021-03-20

## 2021-03-17 RX ADMIN — PIPERACILLIN AND TAZOBACTAM 25 GRAM(S): 4; .5 INJECTION, POWDER, LYOPHILIZED, FOR SOLUTION INTRAVENOUS at 08:49

## 2021-03-17 RX ADMIN — SODIUM CHLORIDE 200 MILLILITER(S): 9 INJECTION, SOLUTION INTRAVENOUS at 08:49

## 2021-03-17 RX ADMIN — ENOXAPARIN SODIUM 40 MILLIGRAM(S): 100 INJECTION SUBCUTANEOUS at 13:25

## 2021-03-17 RX ADMIN — SODIUM CHLORIDE 200 MILLILITER(S): 9 INJECTION, SOLUTION INTRAVENOUS at 13:26

## 2021-03-17 RX ADMIN — PIPERACILLIN AND TAZOBACTAM 25 GRAM(S): 4; .5 INJECTION, POWDER, LYOPHILIZED, FOR SOLUTION INTRAVENOUS at 16:33

## 2021-03-17 RX ADMIN — SODIUM CHLORIDE 100 MILLILITER(S): 9 INJECTION, SOLUTION INTRAVENOUS at 16:15

## 2021-03-18 LAB
ANION GAP SERPL CALC-SCNC: 11 MMOL/L — SIGNIFICANT CHANGE UP (ref 5–17)
BUN SERPL-MCNC: 13 MG/DL — SIGNIFICANT CHANGE UP (ref 7–23)
CALCIUM SERPL-MCNC: 9.8 MG/DL — SIGNIFICANT CHANGE UP (ref 8.4–10.5)
CHLORIDE SERPL-SCNC: 99 MMOL/L — SIGNIFICANT CHANGE UP (ref 96–108)
CO2 SERPL-SCNC: 27 MMOL/L — SIGNIFICANT CHANGE UP (ref 22–31)
CREAT SERPL-MCNC: 1.08 MG/DL — SIGNIFICANT CHANGE UP (ref 0.5–1.3)
CULTURE RESULTS: NO GROWTH — SIGNIFICANT CHANGE UP
GLUCOSE SERPL-MCNC: 95 MG/DL — SIGNIFICANT CHANGE UP (ref 70–99)
HCT VFR BLD CALC: 41 % — SIGNIFICANT CHANGE UP (ref 39–50)
HGB BLD-MCNC: 14.4 G/DL — SIGNIFICANT CHANGE UP (ref 13–17)
LIDOCAIN IGE QN: 550 U/L — HIGH (ref 73–393)
MCHC RBC-ENTMCNC: 30.6 PG — SIGNIFICANT CHANGE UP (ref 27–34)
MCHC RBC-ENTMCNC: 35.1 GM/DL — SIGNIFICANT CHANGE UP (ref 32–36)
MCV RBC AUTO: 87 FL — SIGNIFICANT CHANGE UP (ref 80–100)
NRBC # BLD: 0 /100 WBCS — SIGNIFICANT CHANGE UP (ref 0–0)
PLATELET # BLD AUTO: 205 K/UL — SIGNIFICANT CHANGE UP (ref 150–400)
POTASSIUM SERPL-MCNC: 3.4 MMOL/L — LOW (ref 3.5–5.3)
POTASSIUM SERPL-SCNC: 3.4 MMOL/L — LOW (ref 3.5–5.3)
RBC # BLD: 4.71 M/UL — SIGNIFICANT CHANGE UP (ref 4.2–5.8)
RBC # FLD: 11.9 % — SIGNIFICANT CHANGE UP (ref 10.3–14.5)
SODIUM SERPL-SCNC: 137 MMOL/L — SIGNIFICANT CHANGE UP (ref 135–145)
SPECIMEN SOURCE: SIGNIFICANT CHANGE UP
WBC # BLD: 10.06 K/UL — SIGNIFICANT CHANGE UP (ref 3.8–10.5)
WBC # FLD AUTO: 10.06 K/UL — SIGNIFICANT CHANGE UP (ref 3.8–10.5)

## 2021-03-18 PROCEDURE — 99233 SBSQ HOSP IP/OBS HIGH 50: CPT

## 2021-03-18 RX ORDER — VANCOMYCIN HCL 1 G
125 VIAL (EA) INTRAVENOUS EVERY 6 HOURS
Refills: 0 | Status: DISCONTINUED | OUTPATIENT
Start: 2021-03-18 | End: 2021-03-20

## 2021-03-18 RX ORDER — POTASSIUM CHLORIDE 20 MEQ
40 PACKET (EA) ORAL ONCE
Refills: 0 | Status: COMPLETED | OUTPATIENT
Start: 2021-03-18 | End: 2021-03-18

## 2021-03-18 RX ADMIN — PIPERACILLIN AND TAZOBACTAM 25 GRAM(S): 4; .5 INJECTION, POWDER, LYOPHILIZED, FOR SOLUTION INTRAVENOUS at 16:33

## 2021-03-18 RX ADMIN — Medication 125 MILLIGRAM(S): at 06:56

## 2021-03-18 RX ADMIN — ENOXAPARIN SODIUM 40 MILLIGRAM(S): 100 INJECTION SUBCUTANEOUS at 11:57

## 2021-03-18 RX ADMIN — PIPERACILLIN AND TAZOBACTAM 25 GRAM(S): 4; .5 INJECTION, POWDER, LYOPHILIZED, FOR SOLUTION INTRAVENOUS at 00:14

## 2021-03-18 RX ADMIN — PIPERACILLIN AND TAZOBACTAM 25 GRAM(S): 4; .5 INJECTION, POWDER, LYOPHILIZED, FOR SOLUTION INTRAVENOUS at 08:15

## 2021-03-18 RX ADMIN — Medication 40 MILLIEQUIVALENT(S): at 16:33

## 2021-03-18 RX ADMIN — Medication 125 MILLIGRAM(S): at 18:04

## 2021-03-18 RX ADMIN — Medication 125 MILLIGRAM(S): at 11:57

## 2021-03-19 LAB
ANION GAP SERPL CALC-SCNC: 12 MMOL/L — SIGNIFICANT CHANGE UP (ref 5–17)
BUN SERPL-MCNC: 16 MG/DL — SIGNIFICANT CHANGE UP (ref 7–23)
CALCIUM SERPL-MCNC: 10 MG/DL — SIGNIFICANT CHANGE UP (ref 8.4–10.5)
CHLORIDE SERPL-SCNC: 103 MMOL/L — SIGNIFICANT CHANGE UP (ref 96–108)
CO2 SERPL-SCNC: 24 MMOL/L — SIGNIFICANT CHANGE UP (ref 22–31)
CREAT SERPL-MCNC: 1.14 MG/DL — SIGNIFICANT CHANGE UP (ref 0.5–1.3)
GLUCOSE SERPL-MCNC: 88 MG/DL — SIGNIFICANT CHANGE UP (ref 70–99)
POTASSIUM SERPL-MCNC: 4.1 MMOL/L — SIGNIFICANT CHANGE UP (ref 3.5–5.3)
POTASSIUM SERPL-SCNC: 4.1 MMOL/L — SIGNIFICANT CHANGE UP (ref 3.5–5.3)
SARS-COV-2 RNA SPEC QL NAA+PROBE: SIGNIFICANT CHANGE UP
SODIUM SERPL-SCNC: 139 MMOL/L — SIGNIFICANT CHANGE UP (ref 135–145)

## 2021-03-19 PROCEDURE — 99233 SBSQ HOSP IP/OBS HIGH 50: CPT

## 2021-03-19 RX ADMIN — ENOXAPARIN SODIUM 40 MILLIGRAM(S): 100 INJECTION SUBCUTANEOUS at 12:37

## 2021-03-19 RX ADMIN — Medication 125 MILLIGRAM(S): at 12:37

## 2021-03-19 RX ADMIN — PIPERACILLIN AND TAZOBACTAM 25 GRAM(S): 4; .5 INJECTION, POWDER, LYOPHILIZED, FOR SOLUTION INTRAVENOUS at 23:49

## 2021-03-19 RX ADMIN — Medication 125 MILLIGRAM(S): at 00:16

## 2021-03-19 RX ADMIN — Medication 125 MILLIGRAM(S): at 17:35

## 2021-03-19 RX ADMIN — PIPERACILLIN AND TAZOBACTAM 25 GRAM(S): 4; .5 INJECTION, POWDER, LYOPHILIZED, FOR SOLUTION INTRAVENOUS at 16:12

## 2021-03-19 RX ADMIN — PIPERACILLIN AND TAZOBACTAM 25 GRAM(S): 4; .5 INJECTION, POWDER, LYOPHILIZED, FOR SOLUTION INTRAVENOUS at 00:16

## 2021-03-19 RX ADMIN — SODIUM CHLORIDE 100 MILLILITER(S): 9 INJECTION, SOLUTION INTRAVENOUS at 05:58

## 2021-03-19 RX ADMIN — PIPERACILLIN AND TAZOBACTAM 25 GRAM(S): 4; .5 INJECTION, POWDER, LYOPHILIZED, FOR SOLUTION INTRAVENOUS at 07:46

## 2021-03-19 RX ADMIN — Medication 125 MILLIGRAM(S): at 05:58

## 2021-03-20 ENCOUNTER — TRANSCRIPTION ENCOUNTER (OUTPATIENT)
Age: 41
End: 2021-03-20

## 2021-03-20 VITALS
SYSTOLIC BLOOD PRESSURE: 124 MMHG | OXYGEN SATURATION: 98 % | TEMPERATURE: 98 F | RESPIRATION RATE: 18 BRPM | DIASTOLIC BLOOD PRESSURE: 88 MMHG | HEART RATE: 79 BPM

## 2021-03-20 PROCEDURE — U0005: CPT

## 2021-03-20 PROCEDURE — 87086 URINE CULTURE/COLONY COUNT: CPT

## 2021-03-20 PROCEDURE — 99239 HOSP IP/OBS DSCHRG MGMT >30: CPT

## 2021-03-20 PROCEDURE — 85027 COMPLETE CBC AUTOMATED: CPT

## 2021-03-20 PROCEDURE — 83735 ASSAY OF MAGNESIUM: CPT

## 2021-03-20 PROCEDURE — 85025 COMPLETE CBC W/AUTO DIFF WBC: CPT

## 2021-03-20 PROCEDURE — 87507 IADNA-DNA/RNA PROBE TQ 12-25: CPT

## 2021-03-20 PROCEDURE — 83690 ASSAY OF LIPASE: CPT

## 2021-03-20 PROCEDURE — 84100 ASSAY OF PHOSPHORUS: CPT

## 2021-03-20 PROCEDURE — 87040 BLOOD CULTURE FOR BACTERIA: CPT

## 2021-03-20 PROCEDURE — 86769 SARS-COV-2 COVID-19 ANTIBODY: CPT

## 2021-03-20 PROCEDURE — 81003 URINALYSIS AUTO W/O SCOPE: CPT

## 2021-03-20 PROCEDURE — 87493 C DIFF AMPLIFIED PROBE: CPT

## 2021-03-20 PROCEDURE — 36415 COLL VENOUS BLD VENIPUNCTURE: CPT

## 2021-03-20 PROCEDURE — 93005 ELECTROCARDIOGRAM TRACING: CPT

## 2021-03-20 PROCEDURE — 80048 BASIC METABOLIC PNL TOTAL CA: CPT

## 2021-03-20 PROCEDURE — U0003: CPT

## 2021-03-20 PROCEDURE — 82150 ASSAY OF AMYLASE: CPT

## 2021-03-20 RX ORDER — VANCOMYCIN HCL 1 G
1 VIAL (EA) INTRAVENOUS
Qty: 40 | Refills: 0
Start: 2021-03-20 | End: 2021-03-29

## 2021-03-20 RX ORDER — VANCOMYCIN HCL 1 G
1 VIAL (EA) INTRAVENOUS
Qty: 68 | Refills: 0
Start: 2021-03-20 | End: 2021-04-05

## 2021-03-20 RX ADMIN — ENOXAPARIN SODIUM 40 MILLIGRAM(S): 100 INJECTION SUBCUTANEOUS at 12:14

## 2021-03-20 RX ADMIN — PIPERACILLIN AND TAZOBACTAM 25 GRAM(S): 4; .5 INJECTION, POWDER, LYOPHILIZED, FOR SOLUTION INTRAVENOUS at 08:14

## 2021-03-20 RX ADMIN — Medication 125 MILLIGRAM(S): at 06:08

## 2021-03-20 RX ADMIN — Medication 125 MILLIGRAM(S): at 12:15

## 2021-03-20 NOTE — DISCHARGE NOTE NURSING/CASE MANAGEMENT/SOCIAL WORK - PATIENT PORTAL LINK FT
You can access the FollowMyHealth Patient Portal offered by [facility] by registering at the following website: [email or web address]. By joining [employer]’s FollowMyHealth portal, you will also be able to view your health information using other applications (apps) compatible with our system.

## 2021-03-20 NOTE — DISCHARGE NOTE PROVIDER - CARE PROVIDER_API CALL
Jazmin Maurice)  Gastroenterology; Internal Medicine  [address] [place] [address]  Phone: [phone]  Fax: [phone]  Follow Up Time:    Jazmin Maurice)  Gastroenterology; Internal Medicine  [address] [place] [address]  Phone: [phone]  Fax: [phone]  Follow Up Time:     Edson Cardoso  INTERNAL MEDICINE  [address] [place] [address], [address] [address]  Phone: [phone]  Fax: [phone]  Follow Up Time:     Edson Betancur  GASTROENTEROLOGY  [address] [facility], [address]  [place] [address]  Phone: [phone]  Fax: [phone]  Follow Up Time:

## 2021-03-20 NOTE — DISCHARGE NOTE PROVIDER - CARE PROVIDERS DIRECT ADDRESSES
,[email or web address],DirectAddress_Unknown,DirectAddress_Unknown

## 2021-03-20 NOTE — DISCHARGE NOTE PROVIDER - NSDCCPCAREPLAN_GEN_ALL_CORE_FT
PRINCIPAL DISCHARGE DIAGNOSIS  Diagnosis: Sigmoid diverticulitis  Assessment and Plan of Treatment: vancomycine Po x17 and augumentin for 7 days.  f/u GI in 2-3 weeks.      SECONDARY DISCHARGE DIAGNOSES  Diagnosis: Pancreatitis  Assessment and Plan of Treatment: resolved.    Diagnosis: Obesity  Assessment and Plan of Treatment: weight reduction.

## 2021-03-20 NOTE — DISCHARGE NOTE PROVIDER - NSDCMRMEDTOKEN_GEN_ALL_CORE_FT
Augmentin 500 mg-125 mg oral tablet: 1 tab(s) orally every 8 hours   vancomycin 125 mg oral capsule: 1 cap(s) orally every 6 hours

## 2021-03-20 NOTE — DISCHARGE NOTE PROVIDER - PROVIDER TOKENS
PROVIDER:[TOKEN:[[id number]]] PROVIDER:[TOKEN:[[id number]]],PROVIDER:[TOKEN:[[id number]]],PROVIDER:[TOKEN:[[id number]]]

## 2021-03-20 NOTE — PROGRESS NOTE ADULT - ASSESSMENT
40-year-old male with left lower quadrant pain found to have uncomplicated acute proximal sigmoid diverticulitis, first episode.    1.  Continue IV antibiotics  2.  Advance diet to full liquids As tolerated  3.  DVT prophylaxis  4.  Discussed findings with patient, patient will need to have colonoscopy in 6 weeks as outpatient.  Patient ready has an appointment with GI doctor this month, Dr. Edson Betancur.   5.  Discussed condition with wife, Lucila, as well as the need for follow-up in 4-6 weeks with colonoscopy.    Jazmin Maurice MD  Gastroenterology  [address] [address] [phone]    ACP (advance care planning). Plan: Advanced care planning was discussed with patient and family.  Advanced care planning forms were reviewed and discussed.  Risks, benefits and alternatives of gastroenterologic procedures were discussed in detail and all questions were answered.    30 minutes spent.  
40-year-old male with left lower quadrant pain found to have uncomplicated acute proximal sigmoid diverticulitis, first episode.  Patient also found to be C. difficile by PCR positive  Lipase elevation without radiographic findings of pancreatitis, no right upper quadrant pain or epigastric pain    1.  Continue vancomycin 125 every 6 hours  2.  Advance to low-fat, low-residue diet  3.  DVT prophylaxis  4.  Discussed findings with patient, patient will need to have colonoscopy in 6 weeks as outpatient.  Patient ready has an appointment with GI doctor this month, Dr. Edson Betancur.       Jazmin Maurice MD  Gastroenterology  [place] [id number] [phone]    
40-year-old male with left lower quadrant pain found to have uncomplicated acute proximal sigmoid diverticulitis, first episode.  Patient also found to be C. difficile by PCR positive  Lipase elevation without radiographic findings of pancreatitis, no right upper quadrant pain or epigastric pain    1.  Would switch Zosyn to Cipro/Flagyl, Continue vancomycin 125 every 6 hours  2.  Advance diet to full liquids As tolerated, If no increase in abdominal pain, can advance to low residue, low-fat diet  3.  DVT prophylaxis  4.  Discussed findings with patient, patient will need to have colonoscopy in 6 weeks as outpatient.  Patient ready has an appointment with GI doctor this month, Dr. Edson Betancur.       Jazmin Maurice MD  Gastroenterology  [address] [address] [phone]    
40-year-old male with left lower quadrant pain found to have uncomplicated acute proximal sigmoid diverticulitis, first episode.  Patient also found to be C. difficile by PCR positive  Lipase elevation without radiographic findings of pancreatitis, no right upper quadrant pain or epigastric pain    1.  Continue vancomycin 125 every 6 hours  2.  Advance to low-fat, low-residue diet  3.  DVT prophylaxis  4.  Discussed findings with patient, patient will need to have colonoscopy in 6 weeks as outpatient.  Patient ready has an appointment with GI doctor this month, Dr. Edson Betancur.         
39 y/o M with PMH of Nephrolithiasis s/p Lithotripsy and Obesity presented with LLQ abdominal pain.     Problem/Plan - 1:  ·  Problem: Sigmoid diverticulitis.    IV antibiotics.  Full liquid diet.     Problem/Plan - 2:  ·  Problem: Leukocytosis.  resolved.     Problem/Plan - 3:  ·  Problem: Pancreatitis.  Plan: possibly resolving given the mild elevation, unclear cause, trend lipase, GI consult as stated above.      Problem/Plan - 4:  ·  Problem: Obesity.  Plan: denies any symptoms suggestive of NORMA, on continuous tele monitoring.      Problem/Plan - 5:  ·  Problem: Need for prophylactic measure.  Plan: was started on LMWH 40 mg sub Q daily for DVT prophylaxis.     Plan of care was discuss with patient, all questions were answered, seems understand and in agreement.
41 y/o M with PMH of Nephrolithiasis s/p Lithotripsy and Obesity presented with LLQ abdominal pain.     Problem/Plan - 1:  ·  Problem: Sigmoid diverticulitis.    IV antibiotics.  Full liquid diet.    +C DFIF colitis  started on vancomycin  contact Isolation.   Problem/Plan - 2:  ·  Problem: Leukocytosis.  resolved.     Problem/Plan - 3:  ·  Problem: Pancreatitis.  Plan: possibly resolving given the mild elevation, unclear cause, trend lipase, GI consult as stated above.      Problem/Plan - 4:  ·  Problem: Obesity.  Plan: denies any symptoms suggestive of NORMA, on continuous tele monitoring.      Problem/Plan - 5:  ·  Problem: Need for prophylactic measure.  Plan: was started on LMWH 40 mg sub Q daily for DVT prophylaxis.     Plan of care was discuss with patient and spouse, all questions were answered, seems understand and in agreement.    Discharge plan to home.
39 y/o M with PMH of Nephrolithiasis s/p Lithotripsy and Obesity presented with LLQ abdominal pain.     Problem/Plan - 1:  ·  Problem: Sigmoid diverticulitis.    IV antibiotics.  Full liquid diet.    +C DFIF colitis  started on vancomycin  contact Isolation.   Problem/Plan - 2:  ·  Problem: Leukocytosis.  resolved.     Problem/Plan - 3:  ·  Problem: Pancreatitis.  Plan: possibly resolving given the mild elevation, unclear cause, trend lipase, GI consult as stated above.      Problem/Plan - 4:  ·  Problem: Obesity.  Plan: denies any symptoms suggestive of NORMA, on continuous tele monitoring.      Problem/Plan - 5:  ·  Problem: Need for prophylactic measure.  Plan: was started on LMWH 40 mg sub Q daily for DVT prophylaxis.     Plan of care was discuss with patient and spouse, all questions were answered, seems understand and in agreement.

## 2021-03-20 NOTE — PROGRESS NOTE ADULT - REASON FOR ADMISSION
LLQ abdominal pain.

## 2021-03-20 NOTE — PROGRESS NOTE ADULT - PROVIDER SPECIALTY LIST ADULT
Gastroenterology
Infectious Disease
Hospitalist

## 2021-03-20 NOTE — PROGRESS NOTE ADULT - SUBJECTIVE AND OBJECTIVE BOX
BETY THOMPSON is a 40yMale , patient examined and chart reviewed.    INTERVAL HPI/ OVERNIGHT EVENTS:   Afebrile. Feeling better.   Denies abd pain or diarrhea.  No events.    PAST MEDICAL & SURGICAL HISTORY:  Kidney stones  H/O hernia repair      For details regarding the patient's social history, family history, and other miscellaneous elements, please refer the initial infectious diseases consultation and/or the admitting history and physical examination for this admission.    ROS:  CONSTITUTIONAL:  Negative fever or chills, feels well, good appetite  EYES:  Negative  blurry vision or double vision  CARDIOVASCULAR:  Negative for chest pain or palpitations  RESPIRATORY:  Negative for cough, wheezing, or SOB   GASTROINTESTINAL:  Negative for nausea, vomiting, diarrhea, constipation, or abdominal pain  GENITOURINARY:  Negative frequency, urgency or dysuria  NEUROLOGIC:  No headache, confusion, dizziness, lightheadedness  All other systems were reviewed and are negative         Current inpatient medications :    ANTIBIOTICS/RELEVANT:  piperacillin/tazobactam IVPB.. 3.375 Gram(s) IV Intermittent every 8 hours  vancomycin    Solution 125 milliGRAM(s) Oral every 6 hours      acetaminophen   Tablet .. 650 milliGRAM(s) Oral every 6 hours PRN  enoxaparin Injectable 40 milliGRAM(s) SubCutaneous daily  lactated ringers. 1000 milliLiter(s) IV Continuous <Continuous>      Objective:    03-19 @ 07:01  -  03-20 @ 07:00  --------------------------------------------------------  IN: 1650 mL / OUT: 0 mL / NET: 1650 mL      T(C): 36.6 (03-20-21 @ 11:30), Max: 37 (03-19-21 @ 18:28)  HR: 87 (03-20-21 @ 11:30) (75 - 88)  BP: 123/85 (03-20-21 @ 11:30) (111/73 - 135/89)  RR: 18 (03-20-21 @ 11:30) (18 - 18)  SpO2: 98% (03-20-21 @ 11:30) (96% - 98%)      Physical Exam:  General: no acute distress  Neck: supple, trachea midline  Lungs: clear, no wheeze/rhonchi  Cardiovascular: regular rate and rhythm, S1 S2  Abdomen: soft, nontender,  bowel sounds normal  Neurological: alert and oriented x3  Skin: no rash  Extremities: no cyanosis/clubbing/edema      LABS:    03-19    139  |  103  |  16  ----------------------------<  88  4.1   |  24  |  1.14    Ca    10.0      19 Mar 2021 07:38    MICROBIOLOGY:    Culture - Urine (collected 17 Mar 2021 22:03)  Source: .Urine Catheterized  Final Report (18 Mar 2021 20:10):    No growth    GI PCR Panel, Stool (collected 17 Mar 2021 13:32)  Source: .Stool Feces  Final Report (17 Mar 2021 18:51):    GI PCR Results: NOT detected    *******Please Note:*******    GI panel PCR evaluates for:    Campylobacter, Plesiomonas shigelloides, Salmonella,    Vibrio, Yersinia enterocolitica, Enteroaggregative    Escherichia coli (EAEC), Enteropathogenic E.coli (EPEC),    Enterotoxigenic E. coli (ETEC) lt/st, Shiga-like    toxin-producing E. coli (STEC) stx1/stx2,    Shigella/ Enteroinvasive E. coli (EIEC), Cryptosporidium,    Cyclospora cayetanensis, Entamoeba histolytica,    Giardia lamblia, Adenovirus F 40/41, Astrovirus,    Norovirus GI/GII, Rotavirus A, Sapovirus    Clostridium difficile Toxin by PCR (03.17.21 @ 13:46)    Clostridium difficile Toxin by PCR:     C Diff by PCR Result: Detected    RADIOLOGY & ADDITIONAL STUDIES:    EXAM:  CT ABDOMEN AND PELVIS IC                            PROCEDURE DATE:  03/15/2021          INTERPRETATION:  CLINICAL INFORMATION: Left flank pain    COMPARISON: None.    CONTRAST/COMPLICATIONS:  IV Contrast: Omnipaque 350 / 90 cc administered / 10 cc discarded.  Oral Contrast: NONE  Complications: None reported at time of study completion    PROCEDURE:  CT of the Abdomen and Pelvis was performed.  Sagittal and coronal reformats were performed.    FINDINGS:  LOWER CHEST: Basilar atelectasis.    LIVER: Steatosis.  BILE DUCTS: Normal caliber.  GALLBLADDER: Cholecystectomy.  SPLEEN: Within normal limits.  PANCREAS: Within normal limits.  ADRENALS: Within normal limits.  KIDNEYS/URETERS: 2.4 cm right renal cyst with coarse peripheral calcifications. 1.4 cm left renal cyst. Additional subcentimeter left renal hypodensity is too small to further characterize. No hydronephrosis.    BLADDER: Within normal limits.  REPRODUCTIVE ORGANS: Prostate within normal limits.    BOWEL: No bowel obstruction. Appendix is normal. Colonic diverticulosis. Proximal sigmoid colon wall thickening and pericolonic inflammation.  PERITONEUM: No ascites.  VESSELS: Within normal limits.  RETROPERITONEUM/LYMPH NODES: No lymphadenopathy.  ABDOMINAL WALL: Small fat-containing umbilical hernia. Left inguinal hernia repair.  BONES: Degenerative changes.    IMPRESSION:  Acute sigmoid diverticulitis.        Assessment :   This is a 41 y/o M with PMH of Nephrolithiasis s/p Lithotripsy and Obesity who presented with LLQ abdominal pain admitted with acute sigmoid diverticulitis. Course in hospital complicated by fever and diarrhea sec Cdiff colitis.  Sp fever  WBC normalized  Abd pain and diarrhea resolved    Plan :   On Zosyn and po Vancomycin  When cleared for dc can change to po Augmentin x 7 days and to cont po vanc additional 10 days after completion of Augmentin.   Serial abd exams  Trend temps and cbc  Stable from ID standpoint  Dc home    D/w Dr Jaime Redding      Continue with present regiment.  Appropriate use of antibiotics and adverse effects reviewed.      I have discussed the above plan of care with patient in detail. He expressed understanding of the  treatment plan . Risks, benefits and alternatives discussed in detail. I have asked if he has any questions or concerns and appropriately addressed them to the best of my ability .    > 35 minutes were spent in direct patient care reviewing notes, medications ,labs data/ imaging , discussion with multidisciplinary team.    Thank you for allowing me to participate in care of your patient .    Samina Barakat MD  Infectious Disease  [phone]
INTERVAL HPI/OVERNIGHT EVENTS:  No new overnight event.  No N/V/D.  Tolerating diet.     MEDICATIONS  (STANDING):  enoxaparin Injectable 40 milliGRAM(s) SubCutaneous daily  influenza   Vaccine 0.5 milliLiter(s) IntraMuscular once  lactated ringers. 1000 milliLiter(s) (100 mL/Hr) IV Continuous <Continuous>  piperacillin/tazobactam IVPB.. 3.375 Gram(s) IV Intermittent every 8 hours  vancomycin    Solution 125 milliGRAM(s) Oral every 6 hours    MEDICATIONS  (PRN):  acetaminophen   Tablet .. 650 milliGRAM(s) Oral every 6 hours PRN Mild Pain (1 - 3)      Allergies    No Known Allergies    Intolerances        Review of Systems:    General:  No wt loss, fevers, chills, night sweats,fatigue,   Eyes:  Good vision, no reported pain  ENT:  No sore throat, pain, runny nose, dysphagia  CV:  No pain, palpitatioins, hypo/hypertension  Resp:  No dyspnea, cough, tachypnea, wheezing  GI:  No pain, No nausea, No vomiting, No diarrhea, No constipatiion, No weight loss, No fever, No pruritis, No rectal bleeding, No tarry stools, No dysphagia,  :  No pain, bleeding, incontinence, nocturia  Muscle:  No pain, weakness  Neuro:  No weakness, tingling, memory problems  Psych:  No fatigue, insomnia, mood problems, depression  Endocrine:  No polyuria, polydypsia, cold/heat intolerance  Heme:  No petechiae, ecchymosis, easy bruisability  Skin:  No rash, tattoos, scars, edema      Vital Signs Last 24 Hrs  T(C): 36.6 (20 Mar 2021 11:30), Max: 37 (19 Mar 2021 18:28)  T(F): 97.8 (20 Mar 2021 11:30), Max: 98.6 (19 Mar 2021 18:28)  HR: 87 (20 Mar 2021 11:30) (75 - 88)  BP: 123/85 (20 Mar 2021 11:30) (111/73 - 135/89)  BP(mean): --  RR: 18 (20 Mar 2021 11:30) (18 - 18)  SpO2: 98% (20 Mar 2021 11:30) (96% - 98%)    PHYSICAL EXAM:    Constitutional: NAD, well-developed  HEENT: EOMI, throat clear  Neck: No LAD, supple  Respiratory: CTA and P  Cardiovascular: S1 and S2, RRR, no M  Gastrointestinal: BS+, soft, NT/ND, neg HSM,  Extremities: No peripheral edema, neg clubing, cyanosis  Vascular: 2+ peripheral pulses  Neurological: A/O x 3, no focal deficits  Psychiatric: Normal mood, normal affect  Skin: No rashes      LABS:    03-19    139  |  103  |  16  ----------------------------<  88  4.1   |  24  |  1.14    Ca    10.0      19 Mar 2021 07:38            RADIOLOGY & ADDITIONAL TESTS:  
  Chief Complaint:  Patient is a 40y old  Male who presents with a chief complaint of LLQ abdominal pain. (16 Mar 2021 10:54)      Interval Events:   Fever overnight  Patient reports improvement in abdominal pains    Hospital Medications:  acetaminophen   Tablet .. 650 milliGRAM(s) Oral every 6 hours PRN  enoxaparin Injectable 40 milliGRAM(s) SubCutaneous daily  influenza   Vaccine 0.5 milliLiter(s) IntraMuscular once  lactated ringers. 1000 milliLiter(s) IV Continuous <Continuous>  piperacillin/tazobactam IVPB.. 3.375 Gram(s) IV Intermittent every 8 hours        PHYSICAL EXAM:   Vital Signs:  Vital Signs Last 24 Hrs  T(C): 36.7 (17 Mar 2021 10:09), Max: 38.1 (16 Mar 2021 14:15)  T(F): 98 (17 Mar 2021 10:09), Max: 100.5 (16 Mar 2021 14:15)  HR: 78 (17 Mar 2021 10:09) (69 - 102)  BP: 135/95 (17 Mar 2021 10:09) (117/77 - 143/92)  BP(mean): --  RR: 20 (17 Mar 2021 10:09) (17 - 20)  SpO2: 96% (17 Mar 2021 10:09) (96% - 98%)  Daily     Daily Weight in k.6 (17 Mar 2021 05:50)    GENERAL:  Appears stated age,  no distress  HEENT:   sclera -anicteric  CHEST:   no increased effort, breath sounds clear  HEART:  Regular rhythm, S1, S2,   ABDOMEN:  Soft, non-tender, non-distended, normoactive bowel sounds,    EXTEREMITIES:  no cyanosis,clubbing or edema  SKIN:  No rash/no jaundice   NEURO:  Alert, oriented    LABS:                        15.3   10.61 )-----------( 198      ( 17 Mar 2021 07:29 )             43.9     Mean Cell Volume: 87.5 fl (- @ 07:29)        139  |  104  |  14  ----------------------------<  116<H>  3.7   |  26  |  0.95    Ca    10.0      17 Mar 2021 07:29  Phos  2.9     03-17  Mg     2.0     03-17                                    15.3   10.61 )-----------( 198      ( 17 Mar 2021 07:29 )             43.9                         14.4   13.90 )-----------( 163      ( 16 Mar 2021 07:57 )             41.3                         16.6   20.18 )-----------( 202      ( 15 Mar 2021 09:12 )             46.1     Imaging:          
  Chief Complaint:  Patient is a 40y old  Male who presents with a chief complaint of LLQ abdominal pain. (18 Mar 2021 10:08)      Interval Events:   still With lower abdominal pain, left quadrant, diarrhea    Hospital Medications:  acetaminophen   Tablet .. 650 milliGRAM(s) Oral every 6 hours PRN  enoxaparin Injectable 40 milliGRAM(s) SubCutaneous daily  influenza   Vaccine 0.5 milliLiter(s) IntraMuscular once  lactated ringers. 1000 milliLiter(s) IV Continuous <Continuous>  piperacillin/tazobactam IVPB.. 3.375 Gram(s) IV Intermittent every 8 hours  vancomycin    Solution 125 milliGRAM(s) Oral every 6 hours        PHYSICAL EXAM:   Vital Signs:  Vital Signs Last 24 Hrs  T(C): 36.9 (18 Mar 2021 10:10), Max: 37.1 (17 Mar 2021 22:18)  T(F): 98.4 (18 Mar 2021 10:10), Max: 98.7 (17 Mar 2021 22:18)  HR: 75 (18 Mar 2021 10:10) (75 - 87)  BP: 139/92 (18 Mar 2021 10:10) (110/76 - 139/92)  BP(mean): --  RR: 16 (18 Mar 2021 10:10) (16 - 18)  SpO2: 95% (18 Mar 2021 10:10) (95% - 98%)  Daily     Daily     GENERAL:  Appears stated age,  no distress  HEENT:   sclera -anicteric  CHEST:   no increased effort, breath sounds clear  HEART:  Regular rhythm, S1, S2,   ABDOMEN:  Soft, non-tender, non-distended, normoactive bowel sounds,    EXTEREMITIES:  no cyanosis,clubbing or edema  SKIN:  No rash/no jaundice   NEURO:  Alert, oriented    LABS:                        14.4   10.06 )-----------( 205      ( 18 Mar 2021 08:00 )             41.0     Mean Cell Volume: 87.0 fl (-21 @ 08:00)    03-18    137  |  99  |  13  ----------------------------<  95  3.4<L>   |  27  |  1.08    Ca    9.8      18 Mar 2021 08:00  Phos  2.9     03-17  Mg     2.0     03-          Urinalysis Basic - ( 17 Mar 2021 17:11 )    Color: Yellow / Appearance: Clear / S.010 / pH: x  Gluc: x / Ketone: Negative  / Bili: Negative / Urobili: Negative mg/dL   Blood: x / Protein: Negative mg/dL / Nitrite: Negative   Leuk Esterase: Negative / RBC: x / WBC x   Sq Epi: x / Non Sq Epi: x / Bacteria: x      Amylase Serum--      Lipase [id number]       Ammonia--                          14.4   10.06 )-----------( 205      ( 18 Mar 2021 08:00 )             41.0                         15.3   10.61 )-----------( 198      ( 17 Mar 2021 07:29 )             43.9                         14.4   13.90 )-----------( 163      ( 16 Mar 2021 07:57 )             41.3     C Diff by PCR Result: Detected (21 @ 13:46)           
  Chief Complaint:  Patient is a 40y old  Male who presents with a chief complaint of LLQ abdominal pain. (18 Mar 2021 10:45)      Interval Events:   Patient denies any abdominal pain.  He had one episode of watery stool then followed by a loose stool but becoming formed.  Hospital Medications:  acetaminophen   Tablet .. 650 milliGRAM(s) Oral every 6 hours PRN  enoxaparin Injectable 40 milliGRAM(s) SubCutaneous daily  influenza   Vaccine 0.5 milliLiter(s) IntraMuscular once  lactated ringers. 1000 milliLiter(s) IV Continuous <Continuous>  piperacillin/tazobactam IVPB.. 3.375 Gram(s) IV Intermittent every 8 hours  vancomycin    Solution 125 milliGRAM(s) Oral every 6 hours        PHYSICAL EXAM:   Vital Signs:  Vital Signs Last 24 Hrs  T(C): 36.7 (19 Mar 2021 05:25), Max: 36.8 (19 Mar 2021 01:18)  T(F): 98 (19 Mar 2021 05:25), Max: 98.2 (19 Mar 2021 01:18)  HR: 81 (19 Mar 2021 05:25) (73 - 91)  BP: 123/83 (19 Mar 2021 05:25) (123/83 - 131/88)  BP(mean): --  RR: 17 (19 Mar 2021 05:25) (16 - 17)  SpO2: 96% (19 Mar 2021 05:25) (96% - 96%)  Daily     Daily     GENERAL:  Appears stated age,  no distress  HEENT:   sclera -anicteric  CHEST:   no increased effort, breath sounds clear  HEART:  Regular rhythm, S1, S2,   ABDOMEN:  Soft, non-tender, non-distended, normoactive bowel sounds,    EXTEREMITIES:  no cyanosis,clubbing or edema  SKIN:  No rash/no jaundice   NEURO:  Alert, oriented    LABS:                        14.4   10.06 )-----------( 205      ( 18 Mar 2021 08:00 )             41.0       03-19    139  |  103  |  16  ----------------------------<  88  4.1   |  24  |  1.14    Ca    10.0      19 Mar 2021 07:38          Urinalysis Basic - ( 17 Mar 2021 17:11 )    Color: Yellow / Appearance: Clear / S.010 / pH: x  Gluc: x / Ketone: Negative  / Bili: Negative / Urobili: Negative mg/dL   Blood: x / Protein: Negative mg/dL / Nitrite: Negative   Leuk Esterase: Negative / RBC: x / WBC x   Sq Epi: x / Non Sq Epi: x / Bacteria: x                              14.4   10.06 )-----------( 205      ( 18 Mar 2021 08:00 )             41.0                         15.3   10.61 )-----------( 198      ( 17 Mar 2021 07:29 )             43.9     Imaging:          
Patient is a 40y old  Male who presents with a chief complaint of LLQ abdominal pain. (17 Mar 2021 11:10)      INTERVAL HPI/OVERNIGHT EVENTS:  Pt is seen and examined.  c/o diarrhea and abdominal pain at times.  Pain Location & Control:     MEDICATIONS  (STANDING):  enoxaparin Injectable 40 milliGRAM(s) SubCutaneous daily  influenza   Vaccine 0.5 milliLiter(s) IntraMuscular once  lactated ringers. 1000 milliLiter(s) (100 mL/Hr) IV Continuous <Continuous>  piperacillin/tazobactam IVPB.. 3.375 Gram(s) IV Intermittent every 8 hours  vancomycin    Solution 125 milliGRAM(s) Oral every 6 hours    MEDICATIONS  (PRN):  acetaminophen   Tablet .. 650 milliGRAM(s) Oral every 6 hours PRN Mild Pain (1 - 3)      Allergies    No Known Allergies    Intolerances      Vital Signs Last 24 Hrs  T(C): 36.9 (18 Mar 2021 05:57), Max: 37.1 (17 Mar 2021 22:18)  T(F): 98.4 (18 Mar 2021 05:57), Max: 98.7 (17 Mar 2021 22:18)  HR: 87 (18 Mar 2021 05:57) (78 - 87)  BP: 110/76 (18 Mar 2021 05:57) (110/76 - 135/95)  BP(mean): --  RR: 18 (18 Mar 2021 05:57) (18 - 20)  SpO2: 98% (18 Mar 2021 05:57) (95% - 98%)        LABS:                        14.4   10.06 )-----------( 205      ( 18 Mar 2021 08:00 )             41.0     18 Mar 2021 08:00    137    |  99     |  13     ----------------------------<  95     3.4     |  27     |  1.08     Ca    9.8        18 Mar 2021 08:00        Urinalysis Basic - ( 17 Mar 2021 17:11 )    Color: Yellow / Appearance: Clear / S.010 / pH: x  Gluc: x / Ketone: Negative  / Bili: Negative / Urobili: Negative mg/dL   Blood: x / Protein: Negative mg/dL / Nitrite: Negative   Leuk Esterase: Negative / RBC: x / WBC x   Sq Epi: x / Non Sq Epi: x / Bacteria: x      Cultures  Culture Results:   GI PCR Results: NOT detected  *******Please Note:*******  GI panel PCR evaluates for:  Campylobacter, Plesiomonas shigelloides, Salmonella,  Vibrio, Yersinia enterocolitica, Enteroaggregative  Escherichia coli (EAEC), Enteropathogenic E.coli (EPEC),  Enterotoxigenic E. coli (ETEC) lt/st, Shiga-like  toxin-producing E. coli (STEC) stx1/stx2,  Shigella/ Enteroinvasive E. coli (EIEC), Cryptosporidium,  Cyclospora cayetanensis, Entamoeba histolytica,  Giardia lamblia, Adenovirus F 40/41, Astrovirus,  Norovirus GI/GII, Rotavirus A, Sapovirus ( @ 13:32)  Culture Results:   No growth to date. ( @ 22:13)  Culture Results:   No growth to date. ( @ 22:13)  Culture Results:   <10,000 CFU/mL Normal Urogenital [place] (03-15 @ 12:29)        GI PCR Panel, Stool (collected 21 @ 13:32)  Source: .Stool Feces  Final Report (21 @ 18:51):    GI PCR Results: NOT detected    *******Please Note:*******    GI panel PCR evaluates for:    Campylobacter, Plesiomonas shigelloides, Salmonella,    Vibrio, Yersinia enterocolitica, Enteroaggregative    Escherichia coli (EAEC), Enteropathogenic E.coli (EPEC),    Enterotoxigenic E. coli (ETEC) lt/st, Shiga-like    toxin-producing E. coli (STEC) stx1/stx2,    Shigella/ Enteroinvasive E. coli (EIEC), Cryptosporidium,    Cyclospora cayetanensis, Entamoeba histolytica,    Giardia lamblia, Adenovirus F 40/41, Astrovirus,    Norovirus GI/GII, Rotavirus A, Sapovirus    Culture - Blood (collected 21 @ 22:13)  Source: .Blood  Preliminary Report (21 @ 23:01):    No growth to date.    Culture - Blood (collected 21 @ 22:13)  Source: .Blood Blood  Preliminary Report (21 @ 23:01):    No growth to date.    Culture - Urine (collected 03-15-21 @ 12:29)  Source: .Urine Clean Catch (Midstream)  Final Report (21 @ 14:00):    <10,000 CFU/mL Normal Urogenital [place]        RADIOLOGY & ADDITIONAL TESTS:    Imaging Personally Reviewed:  [ ] YES  [ ] NO    Consultant(s) Notes Reviewed:  [ ] YES  [ ] NO    Care Discussed with Consultants/Other Providers [ x] YES  [ ] NO
Patient is a 40y old  Male who presents with a chief complaint of LLQ abdominal pain. (17 Mar 2021 10:21)      INTERVAL HPI/OVERNIGHT EVENTS:  Pt is seen and examined.  feels better.    Pain Location & Control:     MEDICATIONS  (STANDING):  enoxaparin Injectable 40 milliGRAM(s) SubCutaneous daily  influenza   Vaccine 0.5 milliLiter(s) IntraMuscular once  lactated ringers. 1000 milliLiter(s) (200 mL/Hr) IV Continuous <Continuous>  piperacillin/tazobactam IVPB.. 3.375 Gram(s) IV Intermittent every 8 hours    MEDICATIONS  (PRN):  acetaminophen   Tablet .. 650 milliGRAM(s) Oral every 6 hours PRN Mild Pain (1 - 3)      Allergies    No Known Allergies    Intolerances        Vital Signs Last 24 Hrs  T(C): 36.7 (17 Mar 2021 10:09), Max: 38.1 (16 Mar 2021 14:15)  T(F): 98 (17 Mar 2021 10:09), Max: 100.5 (16 Mar 2021 14:15)  HR: 78 (17 Mar 2021 10:09) (69 - 102)  BP: 135/95 (17 Mar 2021 10:09) (117/77 - 143/92)  BP(mean): --  RR: 20 (17 Mar 2021 10:09) (17 - 20)  SpO2: 96% (17 Mar 2021 10:09) (96% - 98%)        LABS:                        15.3   10.61 )-----------( 198      ( 17 Mar 2021 07:29 )             43.9     17 Mar 2021 07:29    139    |  104    |  14     ----------------------------<  116    3.7     |  26     |  0.95     Ca    10.0       17 Mar 2021 07:29  Phos  2.9       17 Mar 2021 07:29  Mg     2.0       17 Mar 2021 07:29          CAPILLARY BLOOD GLUCOSE      Cultures  Culture Results:   <10,000 CFU/mL Normal Urogenital [place] (03-15 @ 12:29)        Culture - Urine (collected 03-15-21 @ 12:29)  Source: .Urine Clean Catch (Midstream)  Final Report (03-16-21 @ 14:00):    <10,000 CFU/mL Normal Urogenital [place]        RADIOLOGY & ADDITIONAL TESTS:    Imaging Personally Reviewed:  [ ] YES  [ ] NO    Consultant(s) Notes Reviewed:  [ ] YES  [ ] NO    Care Discussed with Consultants/Other Providers [ x] YES  [ ] NO  
Patient is a 40y old  Male who presents with a chief complaint of LLQ abdominal pain. (19 Mar 2021 10:28)      INTERVAL HPI/OVERNIGHT EVENTS:  Pt is seen and examined.  +loose BM today.    Pain Location & Control:     MEDICATIONS  (STANDING):  enoxaparin Injectable 40 milliGRAM(s) SubCutaneous daily  influenza   Vaccine 0.5 milliLiter(s) IntraMuscular once  lactated ringers. 1000 milliLiter(s) (100 mL/Hr) IV Continuous <Continuous>  piperacillin/tazobactam IVPB.. 3.375 Gram(s) IV Intermittent every 8 hours  vancomycin    Solution 125 milliGRAM(s) Oral every 6 hours    MEDICATIONS  (PRN):  acetaminophen   Tablet .. 650 milliGRAM(s) Oral every 6 hours PRN Mild Pain (1 - 3)      Allergies    No Known Allergies    Intolerances    Vital Signs Last 24 Hrs  T(C): 37.1 (19 Mar 2021 10:50), Max: 37.1 (19 Mar 2021 10:50)  T(F): 98.7 (19 Mar 2021 10:50), Max: 98.7 (19 Mar 2021 10:50)  HR: 89 (19 Mar 2021 10:50) (73 - 91)  BP: 133/90 (19 Mar 2021 10:50) (123/83 - 133/90)  BP(mean): --  RR: 18 (19 Mar 2021 10:50) (16 - 18)  SpO2: 94% (19 Mar 2021 10:50) (94% - 96%)        LABS:    19 Mar 2021 07:38    139    |  103    |  16     ----------------------------<  88     4.1     |  24     |  1.14     Ca    10.0       19 Mar 2021 07:38        Urinalysis Basic - ( 17 Mar 2021 17:11 )    Color: Yellow / Appearance: Clear / S.010 / pH: x  Gluc: x / Ketone: Negative  / Bili: Negative / Urobili: Negative mg/dL   Blood: x / Protein: Negative mg/dL / Nitrite: Negative   Leuk Esterase: Negative / RBC: x / WBC x   Sq Epi: x / Non Sq Epi: x / Bacteria: x      CAPILLARY BLOOD GLUCOSE            Cultures  Culture Results:   No growth ( @ 22:03)  Culture Results:   GI PCR Results: NOT detected  *******Please Note:*******  GI panel PCR evaluates for:  Campylobacter, Plesiomonas shigelloides, Salmonella,  Vibrio, Yersinia enterocolitica, Enteroaggregative  Escherichia coli (EAEC), Enteropathogenic E.coli (EPEC),  Enterotoxigenic E. coli (ETEC) lt/st, Shiga-like  toxin-producing E. coli (STEC) stx1/stx2,  Shigella/ Enteroinvasive E. coli (EIEC), Cryptosporidium,  Cyclospora cayetanensis, Entamoeba histolytica,  Giardia lamblia, Adenovirus F 40/41, Astrovirus,  Norovirus GI/GII, Rotavirus A, Sapovirus ( @ 13:32)  Culture Results:   No growth to date. ( @ 22:13)  Culture Results:   No growth to date. ( @ 22:13)  Culture Results:   <10,000 CFU/mL Normal Urogenital [place] (03-15 @ 12:29)        Culture - Urine (collected 21 @ 22:03)  Source: .Urine Catheterized  Final Report (21 @ 20:10):    No growth    GI PCR Panel, Stool (collected 21 @ 13:32)  Source: .Stool Feces  Final Report (21 @ 18:51):    GI PCR Results: NOT detected    *******Please Note:*******    GI panel PCR evaluates for:    Campylobacter, Plesiomonas shigelloides, Salmonella,    Vibrio, Yersinia enterocolitica, Enteroaggregative    Escherichia coli (EAEC), Enteropathogenic E.coli (EPEC),    Enterotoxigenic E. coli (ETEC) lt/st, Shiga-like    toxin-producing E. coli (STEC) stx1/stx2,    Shigella/ Enteroinvasive E. coli (EIEC), Cryptosporidium,    Cyclospora cayetanensis, Entamoeba histolytica,    Giardia lamblia, Adenovirus F 40/41, Astrovirus,    Norovirus GI/GII, Rotavirus A, Sapovirus    Culture - Blood (collected 21 @ 22:13)  Source: .Blood  Preliminary Report (21 @ 23:01):    No growth to date.    Culture - Blood (collected 21 @ 22:13)  Source: .Blood Blood  Preliminary Report (21 @ 23:01):    No growth to date.    Culture - Urine (collected 03-15-21 @ 12:29)  Source: .Urine Clean Catch (Midstream)  Final Report (21 @ 14:00):    <10,000 CFU/mL Normal Urogenital [place]        RADIOLOGY & ADDITIONAL TESTS:    Imaging Personally Reviewed:  [ ] YES  [ ] NO    Consultant(s) Notes Reviewed:  [ ] YES  [ ] NO    Care Discussed with Consultants/Other Providers [x ] YES  [ ] NO

## 2021-03-20 NOTE — DISCHARGE NOTE PROVIDER - HOSPITAL COURSE
39 y/o M with PMH of Nephrolithiasis s/p Lithotripsy and Obesity presented with LLQ abdominal pain.     Problem/Plan - 1:  ·  Problem: Sigmoid diverticulitis.    s/p IV antibiotics.  +C DFIF colitis  started on vancomycin  contact Isolation.   Problem/Plan - 2:  ·  Problem: Leukocytosis.  resolved.     Problem/Plan - 3:  ·  Problem: Pancreatitis.  Plan: possibly resolving given the mild elevation, unclear cause, trend lipase, GI consult as stated above.      Problem/Plan - 4:  ·  Problem: Obesity.  Plan: denies any symptoms suggestive of NORMA, on continuous tele monitoring.       T(C): 36.8 (03-20-21 @ 05:35), Max: 37.1 (03-19-21 @ 10:50)  HR: 75 (03-20-21 @ 05:35) (75 - 89)  BP: 111/73 (03-20-21 @ 05:35) (111/73 - 135/89)  RR: 18 (03-20-21 @ 05:35) (18 - 18)  SpO2: 98% (03-20-21 @ 05:35) (94% - 98%)  Wt(kg): --  REVIEW OF SYSTEMS:    CONSTITUTIONAL: No weakness, fevers or chills  EYES/ENT: No visual changes;  No vertigo or throat pain   NECK: No pain or stiffness  RESPIRATORY: No cough, wheezing, hemoptysis; No shortness of breath  CARDIOVASCULAR: No chest pain or palpitations  GASTROINTESTINAL: No abdominal or epigastric pain. No nausea, vomiting, or hematemesis; No diarrhea or constipation. No melena or hematochezia.  GENITOURINARY: No dysuria, frequency or hematuria  NEUROLOGICAL: No numbness or weakness  SKIN: No itching, burning, rashes, or lesions   All other review of systems is negative unless indicated above.  PHYSICAL EXAM:  GENERAL: NAD, well-groomed, well-developed  HEAD:  Atraumatic, Normocephalic  EYES: EOMI, PERRLA, conjunctiva and sclera clear  ENMT: No tonsillar erythema, exudates, or enlargement; Moist mucous membranes, Good dentition, No lesions  NECK: Supple, No JVD, Normal thyroid  NERVOUS SYSTEM:  Alert & Oriented X3, Good concentration; Motor Strength 5/5 B/L upper and lower extremities; DTRs 2+ intact and symmetric  CHEST/LUNG: Clear to auscultation bilaterally; No rales, rhonchi, wheezing, or rubs  HEART: Regular rate and rhythm; No murmurs, rubs, or gallops  ABDOMEN: Soft, Nontender, Nondistended; Bowel sounds present  EXTREMITIES:  2+ Peripheral Pulses, No clubbing or cyanosis  LYMPH: No lymphadenopathy noted    Time spent -60 minutes  PMD        41 y/o M with PMH of Nephrolithiasis s/p Lithotripsy and Obesity presented with LLQ abdominal pain.     Problem/Plan - 1:  ·  Problem: Sigmoid diverticulitis.    s/p IV antibiotics.  +C DFIF colitis  started on vancomycin  contact Isolation.   Problem/Plan - 2:  ·  Problem: Leukocytosis.  resolved.     Problem/Plan - 3:  ·  Problem: Pancreatitis.  Plan: possibly resolving given the mild elevation, unclear cause, trend lipase, GI consult as stated above.      Problem/Plan - 4:  ·  Problem: Obesity.  Plan: denies any symptoms suggestive of NORMA, on continuous tele monitoring.       T(C): 36.8 (03-20-21 @ 05:35), Max: 37.1 (03-19-21 @ 10:50)  HR: 75 (03-20-21 @ 05:35) (75 - 89)  BP: 111/73 (03-20-21 @ 05:35) (111/73 - 135/89)  RR: 18 (03-20-21 @ 05:35) (18 - 18)  SpO2: 98% (03-20-21 @ 05:35) (94% - 98%)  Wt(kg): --  REVIEW OF SYSTEMS:    CONSTITUTIONAL: No weakness, fevers or chills  EYES/ENT: No visual changes;  No vertigo or throat pain   NECK: No pain or stiffness  RESPIRATORY: No cough, wheezing, hemoptysis; No shortness of breath  CARDIOVASCULAR: No chest pain or palpitations  GASTROINTESTINAL: No abdominal or epigastric pain. No nausea, vomiting, or hematemesis; No diarrhea or constipation. No melena or hematochezia.  GENITOURINARY: No dysuria, frequency or hematuria  NEUROLOGICAL: No numbness or weakness  SKIN: No itching, burning, rashes, or lesions   All other review of systems is negative unless indicated above.  PHYSICAL EXAM:  GENERAL: NAD, well-groomed, well-developed  HEAD:  Atraumatic, Normocephalic  EYES: EOMI, PERRLA, conjunctiva and sclera clear  ENMT: No tonsillar erythema, exudates, or enlargement; Moist mucous membranes, Good dentition, No lesions  NECK: Supple, No JVD, Normal thyroid  NERVOUS SYSTEM:  Alert & Oriented X3, Good concentration; Motor Strength 5/5 B/L upper and lower extremities; DTRs 2+ intact and symmetric  CHEST/LUNG: Clear to auscultation bilaterally; No rales, rhonchi, wheezing, or rubs  HEART: Regular rate and rhythm; No murmurs, rubs, or gallops  ABDOMEN: Soft, Nontender, Nondistended; Bowel sounds present  EXTREMITIES:  2+ Peripheral Pulses, No clubbing or cyanosis  LYMPH: No lymphadenopathy noted    Time spent -60 minutes  PMD - Patient has no PMD, will f/u with his wife's PMD .

## 2021-03-21 LAB
CULTURE RESULTS: SIGNIFICANT CHANGE UP
CULTURE RESULTS: SIGNIFICANT CHANGE UP
SPECIMEN SOURCE: SIGNIFICANT CHANGE UP
SPECIMEN SOURCE: SIGNIFICANT CHANGE UP

## 2021-05-10 ENCOUNTER — EMERGENCY (EMERGENCY)
Facility: HOSPITAL | Age: 41
LOS: 1 days | Discharge: HOME | End: 2021-05-10
Attending: EMERGENCY MEDICINE | Admitting: EMERGENCY MEDICINE
Payer: COMMERCIAL

## 2021-05-10 VITALS
HEART RATE: 85 BPM | RESPIRATION RATE: 16 BRPM | OXYGEN SATURATION: 100 % | DIASTOLIC BLOOD PRESSURE: 74 MMHG | TEMPERATURE: 98 F | SYSTOLIC BLOOD PRESSURE: 112 MMHG

## 2021-05-10 VITALS
RESPIRATION RATE: 18 BRPM | HEART RATE: 81 BPM | SYSTOLIC BLOOD PRESSURE: 120 MMHG | DIASTOLIC BLOOD PRESSURE: 71 MMHG | TEMPERATURE: 98 F | OXYGEN SATURATION: 99 %

## 2021-05-10 DIAGNOSIS — Z98.890 OTHER SPECIFIED POSTPROCEDURAL STATES: Chronic | ICD-10-CM

## 2021-05-10 LAB
ALBUMIN SERPL ELPH-MCNC: 4.2 G/DL — SIGNIFICANT CHANGE UP (ref 3.3–5)
ALP SERPL-CCNC: 69 U/L — SIGNIFICANT CHANGE UP (ref 40–120)
ALT FLD-CCNC: 28 U/L — SIGNIFICANT CHANGE UP (ref 4–41)
ANION GAP SERPL CALC-SCNC: 11 MMOL/L — SIGNIFICANT CHANGE UP (ref 7–14)
AST SERPL-CCNC: 40 U/L — SIGNIFICANT CHANGE UP (ref 4–40)
BASOPHILS # BLD AUTO: 0.03 K/UL — SIGNIFICANT CHANGE UP (ref 0–0.2)
BASOPHILS NFR BLD AUTO: 0.2 % — SIGNIFICANT CHANGE UP (ref 0–2)
BILIRUB SERPL-MCNC: 0.9 MG/DL — SIGNIFICANT CHANGE UP (ref 0.2–1.2)
BUN SERPL-MCNC: 14 MG/DL — SIGNIFICANT CHANGE UP (ref 7–23)
CALCIUM SERPL-MCNC: 9.8 MG/DL — SIGNIFICANT CHANGE UP (ref 8.4–10.5)
CHLORIDE SERPL-SCNC: 103 MMOL/L — SIGNIFICANT CHANGE UP (ref 98–107)
CO2 SERPL-SCNC: 23 MMOL/L — SIGNIFICANT CHANGE UP (ref 22–31)
CREAT SERPL-MCNC: 1.04 MG/DL — SIGNIFICANT CHANGE UP (ref 0.5–1.3)
EOSINOPHIL # BLD AUTO: 0.02 K/UL — SIGNIFICANT CHANGE UP (ref 0–0.5)
EOSINOPHIL NFR BLD AUTO: 0.2 % — SIGNIFICANT CHANGE UP (ref 0–6)
GLUCOSE SERPL-MCNC: 99 MG/DL — SIGNIFICANT CHANGE UP (ref 70–99)
HCT VFR BLD CALC: 43.7 % — SIGNIFICANT CHANGE UP (ref 39–50)
HGB BLD-MCNC: 14.9 G/DL — SIGNIFICANT CHANGE UP (ref 13–17)
IANC: 10.15 K/UL — HIGH (ref 1.5–8.5)
IMM GRANULOCYTES NFR BLD AUTO: 0.5 % — SIGNIFICANT CHANGE UP (ref 0–1.5)
LYMPHOCYTES # BLD AUTO: 1.5 K/UL — SIGNIFICANT CHANGE UP (ref 1–3.3)
LYMPHOCYTES # BLD AUTO: 11.5 % — LOW (ref 13–44)
MCHC RBC-ENTMCNC: 30 PG — SIGNIFICANT CHANGE UP (ref 27–34)
MCHC RBC-ENTMCNC: 34.1 GM/DL — SIGNIFICANT CHANGE UP (ref 32–36)
MCV RBC AUTO: 88.1 FL — SIGNIFICANT CHANGE UP (ref 80–100)
MONOCYTES # BLD AUTO: 1.25 K/UL — HIGH (ref 0–0.9)
MONOCYTES NFR BLD AUTO: 9.6 % — SIGNIFICANT CHANGE UP (ref 2–14)
NEUTROPHILS # BLD AUTO: 10.15 K/UL — HIGH (ref 1.8–7.4)
NEUTROPHILS NFR BLD AUTO: 78 % — HIGH (ref 43–77)
NRBC # BLD: 0 /100 WBCS — SIGNIFICANT CHANGE UP
NRBC # FLD: 0 K/UL — SIGNIFICANT CHANGE UP
PLATELET # BLD AUTO: 206 K/UL — SIGNIFICANT CHANGE UP (ref 150–400)
POTASSIUM SERPL-MCNC: 4.8 MMOL/L — SIGNIFICANT CHANGE UP (ref 3.5–5.3)
POTASSIUM SERPL-SCNC: 4.8 MMOL/L — SIGNIFICANT CHANGE UP (ref 3.5–5.3)
PROT SERPL-MCNC: 7.6 G/DL — SIGNIFICANT CHANGE UP (ref 6–8.3)
RBC # BLD: 4.96 M/UL — SIGNIFICANT CHANGE UP (ref 4.2–5.8)
RBC # FLD: 12.7 % — SIGNIFICANT CHANGE UP (ref 10.3–14.5)
SODIUM SERPL-SCNC: 137 MMOL/L — SIGNIFICANT CHANGE UP (ref 135–145)
WBC # BLD: 13.01 K/UL — HIGH (ref 3.8–10.5)
WBC # FLD AUTO: 13.01 K/UL — HIGH (ref 3.8–10.5)

## 2021-05-10 PROCEDURE — 99285 EMERGENCY DEPT VISIT HI MDM: CPT

## 2021-05-10 PROCEDURE — 99253 IP/OBS CNSLTJ NEW/EST LOW 45: CPT

## 2021-05-10 PROCEDURE — 74177 CT ABD & PELVIS W/CONTRAST: CPT | Mod: 26

## 2021-05-10 RX ORDER — IBUPROFEN 200 MG
600 TABLET ORAL ONCE
Refills: 0 | Status: COMPLETED | OUTPATIENT
Start: 2021-05-10 | End: 2021-05-10

## 2021-05-10 RX ORDER — HYDROMORPHONE HYDROCHLORIDE 2 MG/ML
0.5 INJECTION INTRAMUSCULAR; INTRAVENOUS; SUBCUTANEOUS ONCE
Refills: 0 | Status: DISCONTINUED | OUTPATIENT
Start: 2021-05-10 | End: 2021-05-10

## 2021-05-10 RX ORDER — LIDOCAINE HCL 20 MG/ML
30 VIAL (ML) INJECTION ONCE
Refills: 0 | Status: DISCONTINUED | OUTPATIENT
Start: 2021-05-10 | End: 2021-05-13

## 2021-05-10 RX ORDER — LIDOCAINE HCL 20 MG/ML
10 VIAL (ML) INJECTION ONCE
Refills: 0 | Status: DISCONTINUED | OUTPATIENT
Start: 2021-05-10 | End: 2021-05-10

## 2021-05-10 RX ORDER — ACETAMINOPHEN 500 MG
650 TABLET ORAL ONCE
Refills: 0 | Status: COMPLETED | OUTPATIENT
Start: 2021-05-10 | End: 2021-05-10

## 2021-05-10 RX ADMIN — Medication 650 MILLIGRAM(S): at 17:36

## 2021-05-10 RX ADMIN — Medication 600 MILLIGRAM(S): at 10:37

## 2021-05-10 RX ADMIN — HYDROMORPHONE HYDROCHLORIDE 0.5 MILLIGRAM(S): 2 INJECTION INTRAMUSCULAR; INTRAVENOUS; SUBCUTANEOUS at 17:27

## 2021-05-10 RX ADMIN — Medication 650 MILLIGRAM(S): at 10:37

## 2021-05-10 NOTE — ED PROVIDER NOTE - CLINICAL SUMMARY MEDICAL DECISION MAKING FREE TEXT BOX
Charline Roche Pt is a 42 yo M with a h/o diverticulitis and c diff in March 2021 and colonoscopy 2 weeks ago who presents with rectal and anal pain. no external or internal hemorrhoids on exam and no anal fissures. concern for abscess s/p colonoscopy. will CT abd pelvis and check labs

## 2021-05-10 NOTE — ED PROVIDER NOTE - PROGRESS NOTE DETAILS
Daysi Beasley M.D. Resident  Patient seen by surgery resident, pending discussion with Attending for disposition. Charline - surgery will attempt bedside drainage, will come by in about an hour. no need for abx at home. will dc with surgery f/u in 1 week after Charline - surgery was paged Charline - surgery was called, they will see pt at 5pm Charline - surgery drained abscess, sent cx. pt ok to dc home with f/u with surg

## 2021-05-10 NOTE — ED PROVIDER NOTE - PHYSICAL EXAMINATION
Maura Olivier MD  GENERAL: Patient awake alert NAD.  HEENT: NC/AT, Moist mucous membranes, PERRL, EOMI.  LUNGS: CTAB, no wheezes or crackles.   CARDIAC: RRR, no m/r/g.    ABDOMEN: Soft, NT, ND, No rebound, guarding. No CVA tenderness.   RECTAL: no external hemorrhoids, no anal fissures seen. No internal hemorrhoids palpated, no masses palpated. prostate normal contour. pt is tender throughout internal exam  EXT: No edema.   MSK: no pain with movement, no deformities.  NEURO: A&Ox3. Moving all extremities.  SKIN: Warm and dry. No rash.  PSYCH: Normal affect.

## 2021-05-10 NOTE — ED PROVIDER NOTE - OBJECTIVE STATEMENT
Pt is a 42 yo M with a h/o diverticulitis and c diff in March 2021 and colonoscopy 2 weeks ago who presents with rectal and anal pain. Pt says the colonoscopy 2 weeks ago did not have complications and he was told he has internal hemorroids. 4 days ago on Thursday he had a painful, burning sensation during/after a bowel movement. Since then, the pain has been getting worse and is now constantly painful, m Pt is a 40 yo M with a h/o diverticulitis and c diff in March 2021 and colonoscopy 2 weeks ago who presents with rectal and anal pain. Pt says the colonoscopy 2 weeks ago did not have complications and he was told he has internal hemorrhoids. 4 days ago on Thursday he had a painful, burning sensation during/after a bowel movement. Since then, the pain has been getting worse and is now constantly painful, not able to sit 2/2 pain. Went to UC yesterday, was prescribed steroid cream and preparation H which has not helped. Denies fevers, chills, abd pain, diarrhea, constipation, blood in stool or dark stool

## 2021-05-10 NOTE — CONSULT NOTE ADULT - SUBJECTIVE AND OBJECTIVE BOX
General Surgery Consult  Consulting surgical team: A TEAM SURGERY  Consulting attending: Dr. Catalan    HPI:  41M recently admitted 3/2021 with acute uncomplicated diverticulitis s/p course of abx and recent outpatient colonoscopy with GI 2 weeks ago presents with 3 days of rectal pain, worse with defecation. Tried stool softeners with no improvement. Denies fevers, chills, n/v. Last BM yesterday, nonbloody. CT scan shows 2cm rectal abscess adjacent to anal verge. Colorectal surgery consulted.    PAST MEDICAL HISTORY:  Kidney stones        PAST SURGICAL HISTORY:  H/O hernia repair        MEDICATIONS:      ALLERGIES:  No Known Allergies      VITALS & I/Os:  Vital Signs Last 24 Hrs  T(C): 36.7 (10 May 2021 13:47), Max: 36.9 (10 May 2021 09:09)  T(F): 98 (10 May 2021 13:47), Max: 98.4 (10 May 2021 09:09)  HR: 78 (10 May 2021 13:47) (78 - 85)  BP: 96/68 (10 May 2021 13:47) (96/68 - 112/74)  BP(mean): --  RR: 17 (10 May 2021 13:47) (16 - 18)  SpO2: 99% (10 May 2021 13:47) (99% - 100%)    I&O's Summary      PHYSICAL EXAM:  General: No acute distress  Respiratory: Nonlabored  Cardiovascular: appears well perfused  Abdominal: Soft, nondistended, nontender. No rebound or guarding. No organomegaly, no palpable mass.  Rectal: no surrounding erythema, minimally tender posterior midline to right posterior, no appreciable fluctuance or induration  Extremities: Warm    LABS:                        14.9   13.01 )-----------( 206      ( 10 May 2021 10:01 )             43.7     05-10    137  |  103  |  14  ----------------------------<  99  4.8   |  23  |  1.04    Ca    9.8      10 May 2021 10:01    TPro  7.6  /  Alb  4.2  /  TBili  0.9  /  DBili  x   /  AST  40  /  ALT  28  /  AlkPhos  69  05-10    Lactate:                  IMAGING:    EXAM:  CT ABDOMEN AND PELVIS IC        PROCEDURE DATE:  May 10 2021         INTERPRETATION:  CLINICAL INFORMATION: Rectal pain with recent colonoscopy, evaluate for abscess.    COMPARISON: CT abdomen pelvis 3/15/2021    CONTRAST/COMPLICATIONS:  IV Contrast: Omnipaque 350  90 cc administered   10 cc discarded  Oral Contrast: NONE  Complications: None reported at time of study completion    PROCEDURE:  CT of the Abdomen and Pelvis was performed.  Sagittal and coronal reformats were performed.    FINDINGS:  LOWER CHEST: Within normal limits.    LIVER: Within normal limits.  BILE DUCTS: Normal caliber.  GALLBLADDER: Cholecystectomy.  SPLEEN: Within normal limits.  PANCREAS: Within normal limits.  ADRENALS: Within normal limits.  KIDNEYS/URETERS: No renal stones or hydronephrosis. Redemonstration of a 2.4 cm right renal cyst with coarse peripheral calcifications, unchanged. There is a 1.8 cm left renal cyst which also appears unchanged. Additional subcentimeter hypoattenuating foci, too small to characterize.    BLADDER: Within normal limits.  REPRODUCTIVE ORGANS: Prostate within normal limits.    BOWEL: Colonic diverticulosis with mild inflammation surrounding a distal descending/proximal sigmoid diverticulum (series 2, image 96). No bowel obstruction. Appendix is normal.  PERITONEUM: No ascites.  VESSELS: Within normal limits.  RETROPERITONEUM/LYMPH NODES: No lymphadenopathy.  ABDOMINAL WALL: Small fat-containing umbilical hernia. There is a 2.2 x 2.8 x 2.0 cm (AP x TV x CC) fluid collection with rim enhancement in the soft tissues adjacent to the anal verge.  BONES: Loss of disc height at the L4-L5 level with associated osteophytic changes. There is a right spondylolysis at L5 without evidence of spondylolisthesis.    IMPRESSION:  Possible mild diverticulitis of the distal descending/proximal sigmoid colon.    Small abscess in the soft tissues adjacent to the anal verge.    INOCENCIO POWELL MD; Resident Radiology  This document has been electronically signed.  SATINDER DURAN MD; Attending Radiologist  This document has been electronically signed. May 10 2021 11:43AM

## 2021-05-10 NOTE — ED PROVIDER NOTE - CARE PROVIDER_API CALL
Stephen Catalan)  ColonRectal Surgery; Surgery  [address], [address]  [place] [address]  Phone: [phone]  Fax: [phone]  Follow Up Time:

## 2021-05-10 NOTE — ED PROVIDER NOTE - NS ED ROS FT
GENERAL: No fever, chills  EYES: no vision changes, no discharge.   ENT: no difficulty swallowing or speaking   CARDIAC: no chest pain/pressure, SOB, lower extremity swelling  PULMONARY: no cough, SOB  GI: no abdominal pain, n/v/d  : no dysuria +rectal and anal pain  SKIN: no rashes  NEURO: no headache, lightheadedness, paresthesia  MSK: No joint pain, myalgia, weakness.

## 2021-05-10 NOTE — CONSULT NOTE ADULT - ASSESSMENT
41M recently admitted 3/2021 with acute uncomplicated diverticulitis s/p course of abx and recent outpatient colonoscopy with GI 2 weeks ago presents with 3 days of rectal pain, worse with defecation. Tried stool softeners with no improvement. Denies fevers, chills, n/v. Last BM yesterday, nonbloody. CT scan shows 2cm rectal abscess adjacent to anal verge. Colorectal surgery consulted.    Recommendations:  -will perform bedside I&D of rectal abscess today  -will send cultures  -can follow up with Dr. Catalan on Wednesday 5/12  -no need to continue abx    Discussed with Dr. Mathis PGY2  General Surgery    A TEAM SURGERY  [id number] 41M recently admitted 3/2021 with acute uncomplicated diverticulitis s/p course of abx and recent outpatient colonoscopy with GI 2 weeks ago presents with 3 days of rectal pain, worse with defecation. Tried stool softeners with no improvement. Denies fevers, chills, n/v. Last BM yesterday, nonbloody. CT scan shows 2cm rectal abscess adjacent to anal verge. Colorectal surgery consulted.    Recommendations:  -will perform bedside I&D of rectal abscess today  -will send cultures  -can follow up with Dr. Catalan on Wednesday 5/12  -no need to continue abx  -sitz baths QID  -stool softeners    Discussed with Dr. Mathis PGY2  General Surgery    A TEAM SURGERY  [id number]

## 2021-05-10 NOTE — ED PROVIDER NOTE - NSFOLLOWUPINSTRUCTIONS_ED_ALL_ED_FT
You were seen in the ED for an abscess in your rectum. This was seen on CT of your pelvis.    Surgery drained the abscess at bedside.    You need to follow up in 1 week with the surgeon in his office. Call to make an appointment.    For pain, please take 650mg Tylenol every 6 hours as needed or 600mg ibuprofen every 8 hours as needed. Always take ibuprofen with food. You make take both Tylenol and ibuprofen as described above if one medication is not enough for your pain.    Please follow up with your PCP in 2-3 days. Return to the ED if you experience any worsening or new symptoms or any symptoms that concern you, including fevers, chills, blood in your stool, worsening pain. You were seen in the ED for an abscess in your rectum. This was seen on CT of your pelvis.    Surgery drained the abscess at bedside.    Use stool softeners every day as instructed in the over the counter bottles. i.e. Senna    Take Sitz baths 4 times a day. These can be purchased over the counter.    You need to follow up in 1 week with the surgeon in his office. Call to make an appointment.    For pain, please take 650mg Tylenol every 6 hours as needed or 600mg ibuprofen every 8 hours as needed. Always take ibuprofen with food. You make take both Tylenol and ibuprofen as described above if one medication is not enough for your pain.    Return to the ED if you experience any worsening or new symptoms or any symptoms that concern you, including fevers, chills, blood in your stool, worsening pain.

## 2021-05-10 NOTE — ED ADULT NURSE NOTE - OBJECTIVE STATEMENT
p/t received awake and responsive, c/o of rectal pain for past few days, pain increased with BM, p/t denies any nausea or vomiting, labs drawn and sent as per MD order, nad noted will continue to monitor

## 2021-05-10 NOTE — PROCEDURE NOTE - NSICDXPROCEDURE_GEN_ALL_CORE_FT
PROCEDURES:  Incision, perianal abscess 10-May-2021 18:47:19  Jhonny Comer  Drainage, abscess, perianal 10-May-2021 18:47:22  Jhonny Comer

## 2021-05-10 NOTE — ED PROVIDER NOTE - ATTENDING CONTRIBUTION TO CARE
agree with above hpi  on my exam  vital signs: T(C): 36.9 (05-10-21 @ 09:09), Max: 36.9 (05-10-21 @ 09:09)  HR: 82 (05-10-21 @ 09:50) (82 - 85)  BP: 112/74 (05-10-21 @ 09:09) (112/74 - 112/74)  BP(mean): --  RR: 18 (05-10-21 @ 09:50) (16 - 18)  SpO2: 100% (05-10-21 @ 09:50) (100% - 100%)  GEN - NAD; well appearing; A+O x3   HEAD - NC/AT   EYES- PERRL, EOMI  ENT: Airway patent, mmm, Oral cavity and pharynx normal.    NECK: Neck supple  PULMONARY - CTA b/l, symmetric breath sounds.   CARDIAC -s1s2, RRR, no M,G,R  ABDOMEN - +BS, ND, NT, soft, no guarding, no rebound, no masses  -Rectal performed by dr. marte and chap by me, no external findings, no masses, no bleeding no fissures, no large internal masses, no bogginess to prostate.   BACK - no CVA tenderness, Normal  spine   EXTREMITIES - FROM, symmetric pulses, capillary refill < 2 seconds, no edema   SKIN - no rash or bruising   NEUROLOGIC - alert, speech clear, no focal deficits  PSYCH -nl mood/affect, nl insight.  a/p-patient presents to the ed with rectal/anal pain x 4 d, constant, worsening, now difficult to sit due to pain, no bleeding. Patient had colonoscopy few w ago and was told he had internal hemorrhoids but otherwise ok. On exam no external findings noted, no bleeding, no ext masses, no large internal masses. Given lack of obvious cause for pain and recent scope will ct scan to eval for abscess v. fb v. other and reass.  No fevers, vomiting, abd pain, diarrhea, dysuria, cp, sob, cough.

## 2021-05-10 NOTE — ED PROVIDER NOTE - PATIENT PORTAL LINK FT
You can access the FollowMyHealth Patient Portal offered by [facility] by registering at the following website: [email or web address]. By joining [employer]’s FollowMyHealth portal, you will also be able to view your health information using other applications (apps) compatible with our system.

## 2021-05-10 NOTE — ED ADULT TRIAGE NOTE - CHIEF COMPLAINT QUOTE
Pt c/o hemorrhoids since Friday. Pt states has had burning while making BM's and loose stools over the last couple of days. Denies blood in stool, abdominal pain, fevers, chills, SOB, n/v. Phx: kidney stones, hernia

## 2021-05-12 ENCOUNTER — APPOINTMENT (OUTPATIENT)
Dept: COLORECTAL SURGERY | Facility: CLINIC | Age: 41
End: 2021-05-12
Payer: COMMERCIAL

## 2021-05-12 VITALS
WEIGHT: 210 LBS | HEART RATE: 93 BPM | RESPIRATION RATE: 15 BRPM | TEMPERATURE: 98.5 F | HEIGHT: 66 IN | BODY MASS INDEX: 33.75 KG/M2 | SYSTOLIC BLOOD PRESSURE: 119 MMHG | OXYGEN SATURATION: 99 % | DIASTOLIC BLOOD PRESSURE: 82 MMHG

## 2021-05-12 DIAGNOSIS — Z80.0 FAMILY HISTORY OF MALIGNANT NEOPLASM OF DIGESTIVE ORGANS: ICD-10-CM

## 2021-05-12 DIAGNOSIS — Z78.9 OTHER SPECIFIED HEALTH STATUS: ICD-10-CM

## 2021-05-12 DIAGNOSIS — Z80.8 FAMILY HISTORY OF MALIGNANT NEOPLASM OF OTHER ORGANS OR SYSTEMS: ICD-10-CM

## 2021-05-12 DIAGNOSIS — Z83.3 FAMILY HISTORY OF DIABETES MELLITUS: ICD-10-CM

## 2021-05-12 PROCEDURE — 99213 OFFICE O/P EST LOW 20 MIN: CPT

## 2021-05-12 PROCEDURE — 99072 ADDL SUPL MATRL&STAF TM PHE: CPT

## 2021-05-13 LAB
-  AMIKACIN: SIGNIFICANT CHANGE UP
-  AMOXICILLIN/CLAVULANIC ACID: SIGNIFICANT CHANGE UP
-  AMPICILLIN/SULBACTAM: SIGNIFICANT CHANGE UP
-  AMPICILLIN: SIGNIFICANT CHANGE UP
-  AZTREONAM: SIGNIFICANT CHANGE UP
-  CEFAZOLIN: SIGNIFICANT CHANGE UP
-  CEFEPIME: SIGNIFICANT CHANGE UP
-  CEFOXITIN: SIGNIFICANT CHANGE UP
-  CEFTRIAXONE: SIGNIFICANT CHANGE UP
-  CIPROFLOXACIN: SIGNIFICANT CHANGE UP
-  ERTAPENEM: SIGNIFICANT CHANGE UP
-  GENTAMICIN: SIGNIFICANT CHANGE UP
-  IMIPENEM: SIGNIFICANT CHANGE UP
-  LEVOFLOXACIN: SIGNIFICANT CHANGE UP
-  MEROPENEM: SIGNIFICANT CHANGE UP
-  PIPERACILLIN/TAZOBACTAM: SIGNIFICANT CHANGE UP
-  TOBRAMYCIN: SIGNIFICANT CHANGE UP
-  TRIMETHOPRIM/SULFAMETHOXAZOLE: SIGNIFICANT CHANGE UP
CULTURE RESULTS: SIGNIFICANT CHANGE UP
METHOD TYPE: SIGNIFICANT CHANGE UP
ORGANISM # SPEC MICROSCOPIC CNT: SIGNIFICANT CHANGE UP
ORGANISM # SPEC MICROSCOPIC CNT: SIGNIFICANT CHANGE UP
SPECIMEN SOURCE: SIGNIFICANT CHANGE UP

## 2021-05-13 NOTE — ASSESSMENT
[FreeTextEntry1] : 41-year-old male status post incision and drainage of perianal abscess in ER here for followup. The abscess is adequately drained.

## 2021-05-13 NOTE — PHYSICAL EXAM
[Normal Breath Sounds] : Normal breath sounds [Normal Heart Sounds] : normal heart sounds [Normal Rate and Rhythm] : normal rate and rhythm [No Rash or Lesion] : No rash or lesion [Alert] : alert [Oriented to Person] : oriented to person [Oriented to Place] : oriented to place [Oriented to Time] : oriented to time [Calm] : calm [de-identified] : round, NT/ND, +BS [de-identified] : Abscess adequately incised and drained [de-identified] : well nourished male [de-identified] : NC/AT [de-identified] : LAURA/+ROM [de-identified] : Intact

## 2021-05-13 NOTE — HISTORY OF PRESENT ILLNESS
[FreeTextEntry1] : Patient is a 40 yo WM here to follow up after being in the ER and having an abscess drained.  This past March he was hospitalized for his first Diverticulitis attack.  after having colonoscopy a couple of weeks ago he developed rectal pain and went to the ER.  It was drained and he is feeling a little better.  GI is Dr. Ackerman.  He is having normal bowel movements.

## 2021-05-14 NOTE — ED POST DISCHARGE NOTE - RESULT SUMMARY
CHUYITA Porras: Wound culture +for ecoli. PT was not sent home on abx, per EMR has apt with colorectal surgery on 5/17/21. Pt called back to reassess and see how he is feeling. No answer, left message to return our call.

## 2021-05-17 ENCOUNTER — OUTPATIENT (OUTPATIENT)
Dept: OUTPATIENT SERVICES | Facility: HOSPITAL | Age: 41
LOS: 1 days | End: 2021-05-17
Payer: COMMERCIAL

## 2021-05-17 VITALS
HEART RATE: 74 BPM | SYSTOLIC BLOOD PRESSURE: 136 MMHG | RESPIRATION RATE: 18 BRPM | DIASTOLIC BLOOD PRESSURE: 80 MMHG | OXYGEN SATURATION: 99 % | HEIGHT: 66 IN | TEMPERATURE: 98 F | WEIGHT: 216.05 LBS

## 2021-05-17 DIAGNOSIS — Z90.49 ACQUIRED ABSENCE OF OTHER SPECIFIED PARTS OF DIGESTIVE TRACT: Chronic | ICD-10-CM

## 2021-05-17 DIAGNOSIS — Z98.890 OTHER SPECIFIED POSTPROCEDURAL STATES: Chronic | ICD-10-CM

## 2021-05-17 DIAGNOSIS — K61.0 ANAL ABSCESS: ICD-10-CM

## 2021-05-17 DIAGNOSIS — Z01.818 ENCOUNTER FOR OTHER PREPROCEDURAL EXAMINATION: ICD-10-CM

## 2021-05-17 LAB
HCT VFR BLD CALC: 41.7 % — SIGNIFICANT CHANGE UP (ref 39–50)
HGB BLD-MCNC: 14.5 G/DL — SIGNIFICANT CHANGE UP (ref 13–17)
MCHC RBC-ENTMCNC: 30.7 PG — SIGNIFICANT CHANGE UP (ref 27–34)
MCHC RBC-ENTMCNC: 34.8 GM/DL — SIGNIFICANT CHANGE UP (ref 32–36)
MCV RBC AUTO: 88.2 FL — SIGNIFICANT CHANGE UP (ref 80–100)
NRBC # BLD: 0 /100 WBCS — SIGNIFICANT CHANGE UP (ref 0–0)
PLATELET # BLD AUTO: 287 K/UL — SIGNIFICANT CHANGE UP (ref 150–400)
RBC # BLD: 4.73 M/UL — SIGNIFICANT CHANGE UP (ref 4.2–5.8)
RBC # FLD: 12 % — SIGNIFICANT CHANGE UP (ref 10.3–14.5)
WBC # BLD: 9.62 K/UL — SIGNIFICANT CHANGE UP (ref 3.8–10.5)
WBC # FLD AUTO: 9.62 K/UL — SIGNIFICANT CHANGE UP (ref 3.8–10.5)

## 2021-05-17 PROCEDURE — 85027 COMPLETE CBC AUTOMATED: CPT

## 2021-05-17 PROCEDURE — G0463: CPT

## 2021-05-17 RX ORDER — SODIUM CHLORIDE 9 MG/ML
3 INJECTION INTRAMUSCULAR; INTRAVENOUS; SUBCUTANEOUS EVERY 8 HOURS
Refills: 0 | Status: DISCONTINUED | OUTPATIENT
Start: 2021-05-21 | End: 2021-06-04

## 2021-05-17 RX ORDER — LIDOCAINE HCL 20 MG/ML
0.2 VIAL (ML) INJECTION ONCE
Refills: 0 | Status: DISCONTINUED | OUTPATIENT
Start: 2021-05-21 | End: 2021-06-04

## 2021-05-17 NOTE — H&P PST ADULT - NSANTHOSAYNRD_GEN_A_CORE
No. NORMA screening performed.  STOP BANG Legend: 0-2 = LOW Risk; 3-4 = INTERMEDIATE Risk; 5-8 = HIGH Risk

## 2021-05-17 NOTE — H&P PST ADULT - HISTORY OF PRESENT ILLNESS
This is a 42 y/o male c/o rectal drainage associated with pain, he presents today for anal fistulotomy  5/21/21  covid swab to be done  5/18 at [facility] , denies fever, cough, SOB, change in taste/smell

## 2021-05-17 NOTE — H&P PST ADULT - NSICDXPASTMEDICALHX_GEN_ALL_CORE_FT
PAST MEDICAL HISTORY:  Kidney stones      PAST MEDICAL HISTORY:  Diverticulitis     Kidney stones

## 2021-05-17 NOTE — H&P PST ADULT - DOCUMENT STATUS
IRAM    Iris is a 68 year old female here for wound check. We did I&D on the right labia major yesterday. We placed packing gauze. She reports her pain and swelling is much better. She denies noticeable drainage. She has been taking her Clindamycin without side effects.    Review of Systems    No fevers.     OBJECTIVE:   /58 (Cuff Size: Adult Small)  Pulse 111  Temp 97.5  F (36.4  C) (Oral)  Wt 123 lb (55.8 kg)  SpO2 94%  Breastfeeding? No  BMI 19.85 kg/m2  Physical Exam    The right labia wound has packing gauze which I removed. No significant drainage. The swelling has improved. The tenderness is almost gone.    Assessment and Plan - Decision Making    1. Wound check, abscess    I asked her to wash with water and soap daily. Finish the Clinda. If there is a lump in about 1 month, I asked her to see me.           Authored by Resident/PA/NP

## 2021-05-17 NOTE — H&P PST ADULT - NSICDXPASTSURGICALHX_GEN_ALL_CORE_FT
PAST SURGICAL HISTORY:  H/O hernia repair     History of cholecystectomy     S/P cystoscopy with stent placement /removal 2014

## 2021-05-18 ENCOUNTER — OUTPATIENT (OUTPATIENT)
Dept: OUTPATIENT SERVICES | Facility: HOSPITAL | Age: 41
LOS: 1 days | End: 2021-05-18
Payer: COMMERCIAL

## 2021-05-18 DIAGNOSIS — Z98.890 OTHER SPECIFIED POSTPROCEDURAL STATES: Chronic | ICD-10-CM

## 2021-05-18 DIAGNOSIS — Z11.52 ENCOUNTER FOR SCREENING FOR COVID-19: ICD-10-CM

## 2021-05-18 DIAGNOSIS — Z90.49 ACQUIRED ABSENCE OF OTHER SPECIFIED PARTS OF DIGESTIVE TRACT: Chronic | ICD-10-CM

## 2021-05-18 PROCEDURE — U0005: CPT

## 2021-05-18 PROCEDURE — U0003: CPT

## 2021-05-18 PROCEDURE — C9803: CPT

## 2021-05-19 LAB — SARS-COV-2 RNA SPEC QL NAA+PROBE: SIGNIFICANT CHANGE UP

## 2021-05-20 ENCOUNTER — TRANSCRIPTION ENCOUNTER (OUTPATIENT)
Age: 41
End: 2021-05-20

## 2021-05-21 ENCOUNTER — APPOINTMENT (OUTPATIENT)
Dept: COLORECTAL SURGERY | Facility: HOSPITAL | Age: 41
End: 2021-05-21
Payer: COMMERCIAL

## 2021-05-21 ENCOUNTER — RESULT REVIEW (OUTPATIENT)
Age: 41
End: 2021-05-21

## 2021-05-21 ENCOUNTER — OUTPATIENT (OUTPATIENT)
Dept: OUTPATIENT SERVICES | Facility: HOSPITAL | Age: 41
LOS: 1 days | End: 2021-05-21
Payer: COMMERCIAL

## 2021-05-21 VITALS
HEART RATE: 61 BPM | TEMPERATURE: 98 F | SYSTOLIC BLOOD PRESSURE: 121 MMHG | OXYGEN SATURATION: 100 % | HEIGHT: 66 IN | DIASTOLIC BLOOD PRESSURE: 75 MMHG | RESPIRATION RATE: 18 BRPM | WEIGHT: 216.05 LBS

## 2021-05-21 VITALS
RESPIRATION RATE: 18 BRPM | HEART RATE: 65 BPM | SYSTOLIC BLOOD PRESSURE: 109 MMHG | TEMPERATURE: 97 F | OXYGEN SATURATION: 97 % | DIASTOLIC BLOOD PRESSURE: 74 MMHG

## 2021-05-21 DIAGNOSIS — Z98.890 OTHER SPECIFIED POSTPROCEDURAL STATES: Chronic | ICD-10-CM

## 2021-05-21 DIAGNOSIS — Z90.49 ACQUIRED ABSENCE OF OTHER SPECIFIED PARTS OF DIGESTIVE TRACT: Chronic | ICD-10-CM

## 2021-05-21 DIAGNOSIS — K61.0 ANAL ABSCESS: ICD-10-CM

## 2021-05-21 DIAGNOSIS — Z01.818 ENCOUNTER FOR OTHER PREPROCEDURAL EXAMINATION: ICD-10-CM

## 2021-05-21 PROCEDURE — 46270 REMOVE ANAL FIST SUBQ: CPT

## 2021-05-21 PROCEDURE — 88304 TISSUE EXAM BY PATHOLOGIST: CPT

## 2021-05-21 PROCEDURE — 88304 TISSUE EXAM BY PATHOLOGIST: CPT | Mod: 26

## 2021-05-21 PROCEDURE — C1889: CPT

## 2021-05-21 RX ORDER — OXYCODONE AND ACETAMINOPHEN 5; 325 MG/1; MG/1
1 TABLET ORAL
Qty: 20 | Refills: 0
Start: 2021-05-21

## 2021-05-21 NOTE — ASU DISCHARGE PLAN (ADULT/PEDIATRIC) - CARE PROVIDER_API CALL
Stephen Catalan)  ColonRectal Surgery; Surgery  [address], [address]  [place] [address]  Phone: [phone]  Fax: [phone]  Follow Up Time:

## 2021-05-21 NOTE — ASU PATIENT PROFILE, ADULT - PSH
H/O hernia repair    History of cholecystectomy    S/P cystoscopy  with stent placement /removal 2014

## 2021-05-21 NOTE — PRE-ANESTHESIA EVALUATION ADULT - NSANTHBPHIGHRD_ENT_A_CORE
[FreeTextEntry1] : 65yo F referred for hyponatremia thought to be acute 2/2 poor PO intake in the setting of spironolactone/HCTZ and a suspected baseline of mild chronic SIADH from [facility], here to establish care:\par \par Acute on Chronic Hyponatremia-\par Reviewed inpatient and outpatient labs/documentation with patient\par Sept 9 2019: Cr 1 BUN 29 Na 135 normal serum osm, urine Na 44-72 Uosm 415 \par Oct 2018: Na 130 \par Hyponatremia has been chronic and asymptomatic per pt and preceeding starting spironolactone/hctz she thinks. \par We first thought low Na was 2/2 thiazide inpatient, as she was also with low Cl, K, Mg and Ur Na/Ur Osm on the lower side for SIADH, improved w hypertonic and then fluid restriction while holding both potential agents however when MAOI inhibitor was restarted her Na dropped down significantly again and repeat Ur Na/Ur Osm were very consistent w SIADH. May have been the combo of the two meds and poor PO intaket that triggered it. Psychiatry consulted inpatient who agreed and tried lowering dose to 60mg and she was discharged off diuretics and on 1g TID NA tabs however she was too depressed so she malik dose back up to 90mg. All subsequent Na checks outpt on this regimen have been normal. Advised to stop Na tabs and get blood rechecked in a few weeks.\par \par RTC in 1 month\par \par Addendum: repeat labs off Na tabs, Na 132 serum osm 274, urine Na 40's and urine osm 246, mild SIADH controlled with fluid restriction. Has f/u victor hugo in Dec for which we'll repeat labs again. No

## 2021-05-21 NOTE — ASU DISCHARGE PLAN (ADULT/PEDIATRIC) - ASU DC SPECIAL INSTRUCTIONSFT
BATHING: Please do not submerge wound underwater. You may shower and/or sponge bathe.     ACTIVITY: No heavy lifting or straining. Otherwise, you may return to your usual level of physical activity. If you are taking narcotic pain medication (such as Percocet) DO NOT drive a car, operate machinery or make important decisions.    PAIN: Please take Tylenol for pain. For severe pain you have been prescribed percocet to your pharmacy.      DIET: Return to your usual diet.    NOTIFY YOUR SURGEON IF: You have any bleeding that does not stop, any pus draining from your wound(s), any fever (over 100.4 F) or chills, persistent nausea/vomiting, persistent diarrhea, or if your pain is not controlled on your discharge pain medications.    FOLLOW-UP: Please follow up with Dr. Catalan in 3 weeks in office. You may call the office number provided in order to set up this appointment upon discharge.

## 2021-05-21 NOTE — PRE-ANESTHESIA EVALUATION ADULT - NSANTHOBSERVEDRD_ENT_A_CORE
3/15/2022              [facility]  3/15/2010        [facility],[address]         To Whom It May Concern,    Please be advised that Irlanda Blocker was seen in my office yesterday. Please allow her to use her crutches at school. If you have any questions, please call my office.      Sincerely,    Charmaine Gunderson DO  [address] [place] [address]  [phone]
No

## 2021-05-24 PROBLEM — K57.92 DIVERTICULITIS OF INTESTINE, PART UNSPECIFIED, WITHOUT PERFORATION OR ABSCESS WITHOUT BLEEDING: Chronic | Status: ACTIVE | Noted: 2021-05-17

## 2021-05-28 LAB — SURGICAL PATHOLOGY STUDY: SIGNIFICANT CHANGE UP

## 2021-06-09 ENCOUNTER — APPOINTMENT (OUTPATIENT)
Dept: COLORECTAL SURGERY | Facility: CLINIC | Age: 41
End: 2021-06-09
Payer: COMMERCIAL

## 2021-06-09 PROCEDURE — 99024 POSTOP FOLLOW-UP VISIT: CPT

## 2021-06-09 NOTE — HISTORY OF PRESENT ILLNESS
[FreeTextEntry1] : 41-year-old male status post fistulotomy recovering well. He reports no difficulty with incontinence of stool or gas.

## 2021-08-09 ENCOUNTER — APPOINTMENT (OUTPATIENT)
Dept: COLORECTAL SURGERY | Facility: CLINIC | Age: 41
End: 2021-08-09

## 2021-09-22 ENCOUNTER — APPOINTMENT (OUTPATIENT)
Dept: COLORECTAL SURGERY | Facility: CLINIC | Age: 41
End: 2021-09-22
Payer: COMMERCIAL

## 2021-09-22 DIAGNOSIS — K61.0 ANAL ABSCESS: ICD-10-CM

## 2021-09-22 PROCEDURE — 99212 OFFICE O/P EST SF 10 MIN: CPT | Mod: 25

## 2021-09-22 PROCEDURE — 17250 CHEM CAUT OF GRANLTJ TISSUE: CPT

## 2021-12-08 ENCOUNTER — APPOINTMENT (OUTPATIENT)
Dept: COLORECTAL SURGERY | Facility: CLINIC | Age: 41
End: 2021-12-08
Payer: COMMERCIAL

## 2021-12-08 DIAGNOSIS — K60.3 ANAL FISTULA: ICD-10-CM

## 2021-12-08 PROCEDURE — 99212 OFFICE O/P EST SF 10 MIN: CPT | Mod: 25

## 2021-12-08 PROCEDURE — 46600 DIAGNOSTIC ANOSCOPY SPX: CPT

## 2021-12-08 NOTE — ASSESSMENT
[FreeTextEntry1] : 41-year-old male status post fistulotomy with good outcome of that small area of granulation tissue causing occasional seepage.
has been c/o abd. pain vomiting, and diarrhea since monday fever on thursday 102.

## 2021-12-08 NOTE — HISTORY OF PRESENT ILLNESS
[FreeTextEntry1] : 41-year-old male status post fistulotomy in May of 2021 presents for followup complaining of rectal leakage.

## 2021-12-08 NOTE — ASSESSMENT
[FreeTextEntry1] : 41-year-old male status post fistulotomy presented on going drainage.  There is an irregularity and chronic granulation tissue in the wound bed from his fistulotomy site. There did not appear to be a recurrent fistula or fissure.

## 2021-12-08 NOTE — PHYSICAL EXAM
[de-identified] :  anoscopy reveals posterior midline granulation tissue at previous operative site. no evidence of recurrent fistula

## 2022-02-02 ENCOUNTER — OUTPATIENT (OUTPATIENT)
Dept: OUTPATIENT SERVICES | Facility: HOSPITAL | Age: 42
LOS: 1 days | End: 2022-02-02
Payer: COMMERCIAL

## 2022-02-02 VITALS
HEART RATE: 79 BPM | DIASTOLIC BLOOD PRESSURE: 85 MMHG | HEIGHT: 66 IN | RESPIRATION RATE: 20 BRPM | SYSTOLIC BLOOD PRESSURE: 121 MMHG | OXYGEN SATURATION: 97 % | WEIGHT: 229.94 LBS | TEMPERATURE: 98 F

## 2022-02-02 DIAGNOSIS — K60.3 ANAL FISTULA: ICD-10-CM

## 2022-02-02 DIAGNOSIS — K60.3 ANAL FISTULA: Chronic | ICD-10-CM

## 2022-02-02 DIAGNOSIS — Z90.49 ACQUIRED ABSENCE OF OTHER SPECIFIED PARTS OF DIGESTIVE TRACT: Chronic | ICD-10-CM

## 2022-02-02 DIAGNOSIS — Z98.890 OTHER SPECIFIED POSTPROCEDURAL STATES: Chronic | ICD-10-CM

## 2022-02-02 DIAGNOSIS — Z01.818 ENCOUNTER FOR OTHER PREPROCEDURAL EXAMINATION: ICD-10-CM

## 2022-02-02 LAB
ANION GAP SERPL CALC-SCNC: 13 MMOL/L — SIGNIFICANT CHANGE UP (ref 5–17)
BUN SERPL-MCNC: 21 MG/DL — SIGNIFICANT CHANGE UP (ref 7–23)
CALCIUM SERPL-MCNC: 10.2 MG/DL — SIGNIFICANT CHANGE UP (ref 8.4–10.5)
CHLORIDE SERPL-SCNC: 103 MMOL/L — SIGNIFICANT CHANGE UP (ref 96–108)
CO2 SERPL-SCNC: 22 MMOL/L — SIGNIFICANT CHANGE UP (ref 22–31)
CREAT SERPL-MCNC: 0.96 MG/DL — SIGNIFICANT CHANGE UP (ref 0.5–1.3)
GLUCOSE SERPL-MCNC: 92 MG/DL — SIGNIFICANT CHANGE UP (ref 70–99)
HCT VFR BLD CALC: 43 % — SIGNIFICANT CHANGE UP (ref 39–50)
HGB BLD-MCNC: 15.3 G/DL — SIGNIFICANT CHANGE UP (ref 13–17)
MCHC RBC-ENTMCNC: 31.2 PG — SIGNIFICANT CHANGE UP (ref 27–34)
MCHC RBC-ENTMCNC: 35.6 GM/DL — SIGNIFICANT CHANGE UP (ref 32–36)
MCV RBC AUTO: 87.6 FL — SIGNIFICANT CHANGE UP (ref 80–100)
NRBC # BLD: 0 /100 WBCS — SIGNIFICANT CHANGE UP (ref 0–0)
PLATELET # BLD AUTO: 156 K/UL — SIGNIFICANT CHANGE UP (ref 150–400)
POTASSIUM SERPL-MCNC: 3.7 MMOL/L — SIGNIFICANT CHANGE UP (ref 3.5–5.3)
POTASSIUM SERPL-SCNC: 3.7 MMOL/L — SIGNIFICANT CHANGE UP (ref 3.5–5.3)
RBC # BLD: 4.91 M/UL — SIGNIFICANT CHANGE UP (ref 4.2–5.8)
RBC # FLD: 12.1 % — SIGNIFICANT CHANGE UP (ref 10.3–14.5)
SODIUM SERPL-SCNC: 138 MMOL/L — SIGNIFICANT CHANGE UP (ref 135–145)
WBC # BLD: 8.12 K/UL — SIGNIFICANT CHANGE UP (ref 3.8–10.5)
WBC # FLD AUTO: 8.12 K/UL — SIGNIFICANT CHANGE UP (ref 3.8–10.5)

## 2022-02-02 PROCEDURE — 36415 COLL VENOUS BLD VENIPUNCTURE: CPT

## 2022-02-02 PROCEDURE — 85027 COMPLETE CBC AUTOMATED: CPT

## 2022-02-02 PROCEDURE — G0463: CPT

## 2022-02-02 PROCEDURE — 80048 BASIC METABOLIC PNL TOTAL CA: CPT

## 2022-02-02 RX ORDER — LIDOCAINE HCL 20 MG/ML
0.2 VIAL (ML) INJECTION ONCE
Refills: 0 | Status: DISCONTINUED | OUTPATIENT
Start: 2022-02-15 | End: 2022-03-01

## 2022-02-02 RX ORDER — SODIUM CHLORIDE 9 MG/ML
3 INJECTION INTRAMUSCULAR; INTRAVENOUS; SUBCUTANEOUS EVERY 8 HOURS
Refills: 0 | Status: DISCONTINUED | OUTPATIENT
Start: 2022-02-15 | End: 2022-03-01

## 2022-02-02 RX ORDER — SODIUM CHLORIDE 9 MG/ML
1000 INJECTION, SOLUTION INTRAVENOUS
Refills: 0 | Status: DISCONTINUED | OUTPATIENT
Start: 2022-02-15 | End: 2022-03-01

## 2022-02-02 NOTE — H&P PST ADULT - HISTORY OF PRESENT ILLNESS
41 yr old male with history of Diverticulitis , anal fistula (5/2021) - s/p fistulotomy , with drainage via rectu, sometimes blood . Now coming in for Rectal exam under anesthesia on 2/15/2022.     Denies Recent travel, Exposure or Covid symptoms  Covid test- 2/12/2022

## 2022-02-02 NOTE — H&P PST ADULT - NSICDXPASTSURGICALHX_GEN_ALL_CORE_FT
PAST SURGICAL HISTORY:  H/O hernia repair     History of cholecystectomy     S/P cystoscopy with stent placement /removal 2014     PAST SURGICAL HISTORY:  Anal fistula fistulotomy    H/O hernia repair     History of cholecystectomy     S/P cystoscopy with stent placement /removal 2014

## 2022-02-02 NOTE — H&P PST ADULT - FALL HARM RISK - UNIVERSAL INTERVENTIONS
Bed in lowest position, wheels locked, appropriate side rails in place/Call bell, personal items and telephone in reach/Instruct patient to call for assistance before getting out of bed or chair/Non-slip footwear when patient is out of bed/[place] to call system/Purposeful Proactive Rounding/Room/bathroom lighting operational, light cord in reach

## 2022-02-02 NOTE — H&P PST ADULT - NSICDXPASTMEDICALHX_GEN_ALL_CORE_FT
PAST MEDICAL HISTORY:  Diverticulitis     Kidney stones      PAST MEDICAL HISTORY:  Anal fistula     Diverticulitis     Kidney stones     Obesity

## 2022-02-11 ENCOUNTER — OUTPATIENT (OUTPATIENT)
Dept: OUTPATIENT SERVICES | Facility: HOSPITAL | Age: 42
LOS: 1 days | End: 2022-02-11
Payer: COMMERCIAL

## 2022-02-11 DIAGNOSIS — Z90.49 ACQUIRED ABSENCE OF OTHER SPECIFIED PARTS OF DIGESTIVE TRACT: Chronic | ICD-10-CM

## 2022-02-11 DIAGNOSIS — K60.3 ANAL FISTULA: Chronic | ICD-10-CM

## 2022-02-11 DIAGNOSIS — Z98.890 OTHER SPECIFIED POSTPROCEDURAL STATES: Chronic | ICD-10-CM

## 2022-02-11 DIAGNOSIS — Z11.52 ENCOUNTER FOR SCREENING FOR COVID-19: ICD-10-CM

## 2022-02-11 LAB — SARS-COV-2 RNA SPEC QL NAA+PROBE: SIGNIFICANT CHANGE UP

## 2022-02-11 PROCEDURE — U0003: CPT

## 2022-02-11 PROCEDURE — U0005: CPT

## 2022-02-11 PROCEDURE — C9803: CPT

## 2022-02-14 ENCOUNTER — TRANSCRIPTION ENCOUNTER (OUTPATIENT)
Age: 42
End: 2022-02-14

## 2022-02-15 ENCOUNTER — OUTPATIENT (OUTPATIENT)
Dept: OUTPATIENT SERVICES | Facility: HOSPITAL | Age: 42
LOS: 1 days | End: 2022-02-15
Payer: COMMERCIAL

## 2022-02-15 ENCOUNTER — APPOINTMENT (OUTPATIENT)
Dept: COLORECTAL SURGERY | Facility: HOSPITAL | Age: 42
End: 2022-02-15
Payer: COMMERCIAL

## 2022-02-15 VITALS
TEMPERATURE: 98 F | WEIGHT: 229.94 LBS | DIASTOLIC BLOOD PRESSURE: 69 MMHG | HEART RATE: 73 BPM | HEIGHT: 66 IN | OXYGEN SATURATION: 98 % | RESPIRATION RATE: 17 BRPM | SYSTOLIC BLOOD PRESSURE: 122 MMHG

## 2022-02-15 VITALS
OXYGEN SATURATION: 98 % | RESPIRATION RATE: 15 BRPM | DIASTOLIC BLOOD PRESSURE: 78 MMHG | SYSTOLIC BLOOD PRESSURE: 129 MMHG | TEMPERATURE: 98 F | HEART RATE: 67 BPM

## 2022-02-15 DIAGNOSIS — K60.3 ANAL FISTULA: ICD-10-CM

## 2022-02-15 DIAGNOSIS — Z98.890 OTHER SPECIFIED POSTPROCEDURAL STATES: Chronic | ICD-10-CM

## 2022-02-15 DIAGNOSIS — Z90.49 ACQUIRED ABSENCE OF OTHER SPECIFIED PARTS OF DIGESTIVE TRACT: Chronic | ICD-10-CM

## 2022-02-15 DIAGNOSIS — K60.3 ANAL FISTULA: Chronic | ICD-10-CM

## 2022-02-15 PROCEDURE — 46200 REMOVAL OF ANAL FISSURE: CPT

## 2022-02-15 PROCEDURE — C1889: CPT

## 2022-02-15 DEVICE — SURGICEL 2 X 14": Type: IMPLANTABLE DEVICE | Status: FUNCTIONAL

## 2022-02-15 RX ADMIN — SODIUM CHLORIDE 100 MILLILITER(S): 9 INJECTION, SOLUTION INTRAVENOUS at 13:05

## 2022-02-15 NOTE — BRIEF OPERATIVE NOTE - CONDITION POST OP
Recovering appropriately Complex Repair And O-L Flap Text: The defect edges were debeveled with a #15 scalpel blade.  The primary defect was closed partially with a complex linear closure.  Given the location of the remaining defect, shape of the defect and the proximity to free margins an O-L flap was deemed most appropriate for complete closure of the defect.  Using a sterile surgical marker, an appropriate flap was drawn incorporating the defect and placing the expected incisions within the relaxed skin tension lines where possible.    The area thus outlined was incised deep to adipose tissue with a #15 scalpel blade.  The skin margins were undermined to an appropriate distance in all directions utilizing iris scissors.

## 2022-02-15 NOTE — ASU DISCHARGE PLAN (ADULT/PEDIATRIC) - NS MD DC FALL RISK RISK
For information on Fall & Injury Prevention, visit: [email or web address] OR  [email or web address] OR  https://www.cdc.gov/steadi/patient.html

## 2022-02-15 NOTE — ASU DISCHARGE PLAN (ADULT/PEDIATRIC) - CARE PROVIDER_API CALL
Stephen Catalan)  ColonRectal Surgery; Surgery  [address], [address]  [place] [address]  Phone: [phone]  Fax: [phone]  Follow Up Time: 2 weeks

## 2022-02-15 NOTE — PRE-ANESTHESIA EVALUATION ADULT - NSANTHPMHFT_GEN_ALL_CORE
41 yr old male with history of Diverticulitis , anal fistula (5/2021) - s/p fistulotomy , with drainage via rectum

## 2022-02-15 NOTE — ASU DISCHARGE PLAN (ADULT/PEDIATRIC) - ASU DC SPECIAL INSTRUCTIONSFT
Please refer to printed instruction sheet for recommendations on diet, bowel regimen, and follow up.   For pain control please take 975 mg Tylenol every 6 hours alternating with 400 mg of Ibuprofen every six hours so that a pill is taken every 3 hours.

## 2022-02-15 NOTE — BRIEF OPERATIVE NOTE - NSICDXBRIEFPROCEDURE_GEN_ALL_CORE_FT
PROCEDURES:  Fissurectomy, anal, with sphincterotomy if indicated 15-Feb-2022 14:04:08  Alonzo Sol

## 2022-02-15 NOTE — PRE-ANESTHESIA EVALUATION ADULT - NSANTHAIRWAYFT_ENT_ALL_CORE
Mouth opening: >2cm  Thyromental distance: < 3FB  Upper lip bite: adequate  Cervical ROM: grossly intact

## 2022-02-15 NOTE — PRE-ANESTHESIA EVALUATION ADULT - NSANTHADDINFOFT_GEN_ALL_CORE
Risks and indications for monitored anesthesia care involving, but not limited to, nausea, aspiration or anaphylaxis requiring endotracheal intubation were discussed. Risks were acknowledged and accepted by patient, all of her questions were answered.

## 2022-02-15 NOTE — ASU PATIENT PROFILE, ADULT - NSICDXPASTSURGICALHX_GEN_ALL_CORE_FT
PAST SURGICAL HISTORY:  Anal fistula fistulotomy    H/O hernia repair     History of cholecystectomy     S/P cystoscopy with stent placement /removal 2014

## 2022-02-15 NOTE — ASU PATIENT PROFILE, ADULT - FALL HARM RISK - UNIVERSAL INTERVENTIONS
Bed in lowest position, wheels locked, appropriate side rails in place/Call bell, personal items and telephone in reach/Instruct patient to call for assistance before getting out of bed or chair/Non-slip footwear when patient is out of bed/[place] to call system/Physically safe environment - no spills, clutter or unnecessary equipment/Purposeful Proactive Rounding/Room/bathroom lighting operational, light cord in reach

## 2022-06-26 ENCOUNTER — EMERGENCY (EMERGENCY)
Facility: HOSPITAL | Age: 42
LOS: 1 days | Discharge: HOME | End: 2022-06-26
Attending: EMERGENCY MEDICINE | Admitting: EMERGENCY MEDICINE
Payer: COMMERCIAL

## 2022-06-26 VITALS
HEART RATE: 70 BPM | HEIGHT: 66 IN | SYSTOLIC BLOOD PRESSURE: 150 MMHG | TEMPERATURE: 98 F | OXYGEN SATURATION: 99 % | DIASTOLIC BLOOD PRESSURE: 87 MMHG | RESPIRATION RATE: 14 BRPM | WEIGHT: 229.94 LBS

## 2022-06-26 VITALS
HEART RATE: 64 BPM | SYSTOLIC BLOOD PRESSURE: 133 MMHG | DIASTOLIC BLOOD PRESSURE: 80 MMHG | TEMPERATURE: 98 F | OXYGEN SATURATION: 98 % | RESPIRATION RATE: 16 BRPM

## 2022-06-26 DIAGNOSIS — K60.3 ANAL FISTULA: Chronic | ICD-10-CM

## 2022-06-26 DIAGNOSIS — Z98.890 OTHER SPECIFIED POSTPROCEDURAL STATES: Chronic | ICD-10-CM

## 2022-06-26 DIAGNOSIS — Z90.49 ACQUIRED ABSENCE OF OTHER SPECIFIED PARTS OF DIGESTIVE TRACT: Chronic | ICD-10-CM

## 2022-06-26 LAB
ALBUMIN SERPL ELPH-MCNC: 4.3 G/DL — SIGNIFICANT CHANGE UP (ref 3.3–5)
ALP SERPL-CCNC: 76 U/L — SIGNIFICANT CHANGE UP (ref 30–120)
ALT FLD-CCNC: 57 U/L DA — SIGNIFICANT CHANGE UP (ref 10–60)
ANION GAP SERPL CALC-SCNC: 6 MMOL/L — SIGNIFICANT CHANGE UP (ref 5–17)
APPEARANCE UR: CLEAR — SIGNIFICANT CHANGE UP
AST SERPL-CCNC: 32 U/L — SIGNIFICANT CHANGE UP (ref 10–40)
BASOPHILS # BLD AUTO: 0.06 K/UL — SIGNIFICANT CHANGE UP (ref 0–0.2)
BASOPHILS NFR BLD AUTO: 0.7 % — SIGNIFICANT CHANGE UP (ref 0–2)
BILIRUB SERPL-MCNC: 0.6 MG/DL — SIGNIFICANT CHANGE UP (ref 0.2–1.2)
BILIRUB UR-MCNC: NEGATIVE — SIGNIFICANT CHANGE UP
BUN SERPL-MCNC: 17 MG/DL — SIGNIFICANT CHANGE UP (ref 7–23)
CALCIUM SERPL-MCNC: 10 MG/DL — SIGNIFICANT CHANGE UP (ref 8.4–10.5)
CHLORIDE SERPL-SCNC: 102 MMOL/L — SIGNIFICANT CHANGE UP (ref 96–108)
CO2 SERPL-SCNC: 28 MMOL/L — SIGNIFICANT CHANGE UP (ref 22–31)
COLOR SPEC: YELLOW — SIGNIFICANT CHANGE UP
CREAT SERPL-MCNC: 1.14 MG/DL — SIGNIFICANT CHANGE UP (ref 0.5–1.3)
DIFF PNL FLD: ABNORMAL
EGFR: 81 ML/MIN/1.73M2 — SIGNIFICANT CHANGE UP
EOSINOPHIL # BLD AUTO: 0.14 K/UL — SIGNIFICANT CHANGE UP (ref 0–0.5)
EOSINOPHIL NFR BLD AUTO: 1.6 % — SIGNIFICANT CHANGE UP (ref 0–6)
GLUCOSE SERPL-MCNC: 130 MG/DL — HIGH (ref 70–99)
GLUCOSE UR QL: NEGATIVE MG/DL — SIGNIFICANT CHANGE UP
HCT VFR BLD CALC: 42.1 % — SIGNIFICANT CHANGE UP (ref 39–50)
HGB BLD-MCNC: 15.1 G/DL — SIGNIFICANT CHANGE UP (ref 13–17)
IMM GRANULOCYTES NFR BLD AUTO: 0.3 % — SIGNIFICANT CHANGE UP (ref 0–1.5)
KETONES UR-MCNC: NEGATIVE — SIGNIFICANT CHANGE UP
LEUKOCYTE ESTERASE UR-ACNC: NEGATIVE — SIGNIFICANT CHANGE UP
LIDOCAIN IGE QN: 505 U/L — HIGH (ref 73–393)
LYMPHOCYTES # BLD AUTO: 2.52 K/UL — SIGNIFICANT CHANGE UP (ref 1–3.3)
LYMPHOCYTES # BLD AUTO: 28.3 % — SIGNIFICANT CHANGE UP (ref 13–44)
MCHC RBC-ENTMCNC: 31.2 PG — SIGNIFICANT CHANGE UP (ref 27–34)
MCHC RBC-ENTMCNC: 35.9 GM/DL — SIGNIFICANT CHANGE UP (ref 32–36)
MCV RBC AUTO: 87 FL — SIGNIFICANT CHANGE UP (ref 80–100)
MONOCYTES # BLD AUTO: 0.75 K/UL — SIGNIFICANT CHANGE UP (ref 0–0.9)
MONOCYTES NFR BLD AUTO: 8.4 % — SIGNIFICANT CHANGE UP (ref 2–14)
NEUTROPHILS # BLD AUTO: 5.4 K/UL — SIGNIFICANT CHANGE UP (ref 1.8–7.4)
NEUTROPHILS NFR BLD AUTO: 60.7 % — SIGNIFICANT CHANGE UP (ref 43–77)
NITRITE UR-MCNC: NEGATIVE — SIGNIFICANT CHANGE UP
NRBC # BLD: 0 /100 WBCS — SIGNIFICANT CHANGE UP (ref 0–0)
PH UR: 6 — SIGNIFICANT CHANGE UP (ref 5–8)
PLATELET # BLD AUTO: 185 K/UL — SIGNIFICANT CHANGE UP (ref 150–400)
POTASSIUM SERPL-MCNC: 3.4 MMOL/L — LOW (ref 3.5–5.3)
POTASSIUM SERPL-SCNC: 3.4 MMOL/L — LOW (ref 3.5–5.3)
PROT SERPL-MCNC: 8.1 G/DL — SIGNIFICANT CHANGE UP (ref 6–8.3)
PROT UR-MCNC: NEGATIVE MG/DL — SIGNIFICANT CHANGE UP
RBC # BLD: 4.84 M/UL — SIGNIFICANT CHANGE UP (ref 4.2–5.8)
RBC # FLD: 12 % — SIGNIFICANT CHANGE UP (ref 10.3–14.5)
RBC CASTS # UR COMP ASSIST: SIGNIFICANT CHANGE UP /HPF (ref 0–4)
SODIUM SERPL-SCNC: 136 MMOL/L — SIGNIFICANT CHANGE UP (ref 135–145)
SP GR SPEC: 1.01 — SIGNIFICANT CHANGE UP (ref 1.01–1.02)
UROBILINOGEN FLD QL: NEGATIVE MG/DL — SIGNIFICANT CHANGE UP
WBC # BLD: 8.9 K/UL — SIGNIFICANT CHANGE UP (ref 3.8–10.5)
WBC # FLD AUTO: 8.9 K/UL — SIGNIFICANT CHANGE UP (ref 3.8–10.5)
WBC UR QL: SIGNIFICANT CHANGE UP

## 2022-06-26 PROCEDURE — 83690 ASSAY OF LIPASE: CPT

## 2022-06-26 PROCEDURE — 36415 COLL VENOUS BLD VENIPUNCTURE: CPT

## 2022-06-26 PROCEDURE — 74176 CT ABD & PELVIS W/O CONTRAST: CPT | Mod: 26,MG

## 2022-06-26 PROCEDURE — 85025 COMPLETE CBC W/AUTO DIFF WBC: CPT

## 2022-06-26 PROCEDURE — 80053 COMPREHEN METABOLIC PANEL: CPT

## 2022-06-26 PROCEDURE — 99284 EMERGENCY DEPT VISIT MOD MDM: CPT | Mod: 25

## 2022-06-26 PROCEDURE — G1004: CPT

## 2022-06-26 PROCEDURE — 74176 CT ABD & PELVIS W/O CONTRAST: CPT | Mod: MG

## 2022-06-26 PROCEDURE — 81001 URINALYSIS AUTO W/SCOPE: CPT

## 2022-06-26 PROCEDURE — 99285 EMERGENCY DEPT VISIT HI MDM: CPT

## 2022-06-26 RX ORDER — SODIUM CHLORIDE 9 MG/ML
1000 INJECTION INTRAMUSCULAR; INTRAVENOUS; SUBCUTANEOUS ONCE
Refills: 0 | Status: COMPLETED | OUTPATIENT
Start: 2022-06-26 | End: 2022-06-26

## 2022-06-26 RX ADMIN — SODIUM CHLORIDE 1000 MILLILITER(S): 9 INJECTION INTRAMUSCULAR; INTRAVENOUS; SUBCUTANEOUS at 15:40

## 2022-06-26 NOTE — ED ADULT NURSE NOTE - NSICDXPASTSURGICALHX_GEN_ALL_CORE_FT
PAST SURGICAL HISTORY:  Anal fistula fistulotomy    H/O hernia repair     H/O lithotripsy     History of cholecystectomy     S/P cystoscopy with stent placement /removal 2014

## 2022-06-26 NOTE — ED PROVIDER NOTE - NS ED ATTENDING STATEMENT MOD
This was a shared visit with the BOBBY. I reviewed and verified the documentation and independently performed the documented:

## 2022-06-26 NOTE — ED PROVIDER NOTE - NSFOLLOWUPINSTRUCTIONS_ED_ALL_ED_FT
Pain in the abdomen (abdominal pain) can be caused by many things. Often, abdominal pain is not serious and it gets better with no treatment or by being treated at home. However, sometimes abdominal pain is serious.    Your health care provider will ask questions about your medical history and do a physical exam to try to determine the cause of your abdominal pain.      Follow these instructions at home:    Medicines     •Take over-the-counter and prescription medicines only as told by your health care provider.      • Do not take a laxative unless told by your health care provider.        General instructions      •Watch your condition for any changes.      •Drink enough fluid to keep your urine pale yellow.      •Keep all follow-up visits as told by your health care provider. This is important.        Contact a health care provider if:    •Your abdominal pain changes or gets worse.      •You are not hungry or you lose weight without trying.      •You are constipated or have diarrhea for more than 2–3 days.      •You have pain when you urinate or have a bowel movement.      •Your abdominal pain wakes you up at night.      •Your pain gets worse with meals, after eating, or with certain foods.      •You are vomiting and cannot keep anything down.      •You have a fever.      •You have blood in your urine.        Get help right away if:    •Your pain does not go away as soon as your health care provider told you to expect.      •You cannot stop vomiting.      •Your pain is only in areas of the abdomen, such as the right side or the left lower portion of the abdomen. Pain on the right side could be caused by appendicitis.      •You have bloody or black stools, or stools that look like tar.      •You have severe pain, cramping, or bloating in your abdomen.    •You have signs of dehydration, such as:  •Dark urine, very little urine, or no urine.      •Cracked lips.      •Dry mouth.      •Sunken eyes.      •Sleepiness.      •Weakness.        •You have trouble breathing or chest pain.        Summary    •Often, abdominal pain is not serious and it gets better with no treatment or by being treated at home. However, sometimes abdominal pain is serious.      •Watch your condition for any changes.      •Take over-the-counter and prescription medicines only as told by your health care provider.      •Contact a health care provider if your abdominal pain changes or gets worse.      •Get help right away if you have severe pain, cramping, or bloating in your abdomen.      This information is not intended to replace advice given to you by your health care provider. Make sure you discuss any questions you have with your health care provider.

## 2022-06-26 NOTE — ED PROVIDER NOTE - CLINICAL SUMMARY MEDICAL DECISION MAKING FREE TEXT BOX
42 male with hx of diverticulitis co bilat abd pain since last night assoc with watery diarrhea. Denies fever, NV, melena, dysuria hematuria or other symptom.   PCP/GI Melir  Had colonoscopy last year.    VSS Afebrile, NAD  HEENT - clear  PERRL EOMI  Neck supple  lungs clear  Cor S1S2 RR - MGR  Abd soft nontender, no mass or HSM, no rebound  Ext FROM intact, no edema  Neuro Intact, no deficits.  Skin Warm and dry no rash.  Imp Abd pain. RO Divertic  Plan - labs, urine, CT.    I performed a history and physical exam of the patient and discussed their management with the advanced care provider. I reviewed the advanced care provider's note and agree with the documented findings and plan of care. My medical decision making and objective findings are found above.

## 2022-06-26 NOTE — ED PROVIDER NOTE - PATIENT PORTAL LINK FT
You can access the FollowMyHealth Patient Portal offered by [facility] by registering at the following website: [email or web address]. By joining [employer]’s FollowMyHealth portal, you will also be able to view your health information using other applications (apps) compatible with our system.

## 2022-06-26 NOTE — ED PROVIDER NOTE - OBJECTIVE STATEMENT
42 M hx diverticulitis/colitis/pancreatitis, b/l inguinal hernia repair, kidney stone, cholecystectomy c/o b/l abd pain since last night, now feels it more on right side with radiation towards groin. Had BM x 3 today, more loose each time, until was watery diarrhea. Got concerned due to hx diverticulitis/colitis/pancreatitis last year. Denies f/c, cp, sob, n/v, urinary complaints, flank pain.

## 2022-06-27 PROBLEM — K60.3 ANAL FISTULA: Chronic | Status: ACTIVE | Noted: 2022-02-02

## 2022-06-27 PROBLEM — E66.9 OBESITY, UNSPECIFIED: Chronic | Status: ACTIVE | Noted: 2022-02-02

## 2022-08-31 ENCOUNTER — EMERGENCY (EMERGENCY)
Facility: HOSPITAL | Age: 42
LOS: 1 days | Discharge: HOME | End: 2022-08-31
Attending: EMERGENCY MEDICINE | Admitting: EMERGENCY MEDICINE
Payer: COMMERCIAL

## 2022-08-31 VITALS
SYSTOLIC BLOOD PRESSURE: 131 MMHG | TEMPERATURE: 98 F | DIASTOLIC BLOOD PRESSURE: 85 MMHG | OXYGEN SATURATION: 98 % | HEART RATE: 75 BPM | RESPIRATION RATE: 16 BRPM

## 2022-08-31 VITALS
DIASTOLIC BLOOD PRESSURE: 91 MMHG | RESPIRATION RATE: 16 BRPM | WEIGHT: 214.95 LBS | HEART RATE: 72 BPM | TEMPERATURE: 98 F | HEIGHT: 66 IN | OXYGEN SATURATION: 98 % | SYSTOLIC BLOOD PRESSURE: 135 MMHG

## 2022-08-31 DIAGNOSIS — Z98.890 OTHER SPECIFIED POSTPROCEDURAL STATES: Chronic | ICD-10-CM

## 2022-08-31 DIAGNOSIS — K60.3 ANAL FISTULA: Chronic | ICD-10-CM

## 2022-08-31 DIAGNOSIS — Z90.49 ACQUIRED ABSENCE OF OTHER SPECIFIED PARTS OF DIGESTIVE TRACT: Chronic | ICD-10-CM

## 2022-08-31 LAB
ALBUMIN SERPL ELPH-MCNC: 4.5 G/DL — SIGNIFICANT CHANGE UP (ref 3.3–5)
ALP SERPL-CCNC: 68 U/L — SIGNIFICANT CHANGE UP (ref 30–120)
ALT FLD-CCNC: 48 U/L DA — SIGNIFICANT CHANGE UP (ref 10–60)
ANION GAP SERPL CALC-SCNC: 8 MMOL/L — SIGNIFICANT CHANGE UP (ref 5–17)
AST SERPL-CCNC: 41 U/L — HIGH (ref 10–40)
BASOPHILS # BLD AUTO: 0.03 K/UL — SIGNIFICANT CHANGE UP (ref 0–0.2)
BASOPHILS NFR BLD AUTO: 0.4 % — SIGNIFICANT CHANGE UP (ref 0–2)
BILIRUB SERPL-MCNC: 0.7 MG/DL — SIGNIFICANT CHANGE UP (ref 0.2–1.2)
BUN SERPL-MCNC: 22 MG/DL — SIGNIFICANT CHANGE UP (ref 7–23)
CALCIUM SERPL-MCNC: 9.8 MG/DL — SIGNIFICANT CHANGE UP (ref 8.4–10.5)
CHLORIDE SERPL-SCNC: 103 MMOL/L — SIGNIFICANT CHANGE UP (ref 96–108)
CO2 SERPL-SCNC: 26 MMOL/L — SIGNIFICANT CHANGE UP (ref 22–31)
CREAT SERPL-MCNC: 1.19 MG/DL — SIGNIFICANT CHANGE UP (ref 0.5–1.3)
EGFR: 77 ML/MIN/1.73M2 — SIGNIFICANT CHANGE UP
EOSINOPHIL # BLD AUTO: 0.08 K/UL — SIGNIFICANT CHANGE UP (ref 0–0.5)
EOSINOPHIL NFR BLD AUTO: 1.1 % — SIGNIFICANT CHANGE UP (ref 0–6)
GLUCOSE SERPL-MCNC: 92 MG/DL — SIGNIFICANT CHANGE UP (ref 70–99)
HCT VFR BLD CALC: 43.4 % — SIGNIFICANT CHANGE UP (ref 39–50)
HGB BLD-MCNC: 15.3 G/DL — SIGNIFICANT CHANGE UP (ref 13–17)
IMM GRANULOCYTES NFR BLD AUTO: 0.4 % — SIGNIFICANT CHANGE UP (ref 0–1.5)
LYMPHOCYTES # BLD AUTO: 1.95 K/UL — SIGNIFICANT CHANGE UP (ref 1–3.3)
LYMPHOCYTES # BLD AUTO: 25.8 % — SIGNIFICANT CHANGE UP (ref 13–44)
MCHC RBC-ENTMCNC: 31.2 PG — SIGNIFICANT CHANGE UP (ref 27–34)
MCHC RBC-ENTMCNC: 35.3 GM/DL — SIGNIFICANT CHANGE UP (ref 32–36)
MCV RBC AUTO: 88.4 FL — SIGNIFICANT CHANGE UP (ref 80–100)
MONOCYTES # BLD AUTO: 0.72 K/UL — SIGNIFICANT CHANGE UP (ref 0–0.9)
MONOCYTES NFR BLD AUTO: 9.5 % — SIGNIFICANT CHANGE UP (ref 2–14)
NEUTROPHILS # BLD AUTO: 4.76 K/UL — SIGNIFICANT CHANGE UP (ref 1.8–7.4)
NEUTROPHILS NFR BLD AUTO: 62.8 % — SIGNIFICANT CHANGE UP (ref 43–77)
NRBC # BLD: 0 /100 WBCS — SIGNIFICANT CHANGE UP (ref 0–0)
PLATELET # BLD AUTO: 181 K/UL — SIGNIFICANT CHANGE UP (ref 150–400)
POTASSIUM SERPL-MCNC: 4.6 MMOL/L — SIGNIFICANT CHANGE UP (ref 3.5–5.3)
POTASSIUM SERPL-SCNC: 4.6 MMOL/L — SIGNIFICANT CHANGE UP (ref 3.5–5.3)
PROT SERPL-MCNC: 8.3 G/DL — SIGNIFICANT CHANGE UP (ref 6–8.3)
RBC # BLD: 4.91 M/UL — SIGNIFICANT CHANGE UP (ref 4.2–5.8)
RBC # FLD: 12.2 % — SIGNIFICANT CHANGE UP (ref 10.3–14.5)
SODIUM SERPL-SCNC: 137 MMOL/L — SIGNIFICANT CHANGE UP (ref 135–145)
WBC # BLD: 7.57 K/UL — SIGNIFICANT CHANGE UP (ref 3.8–10.5)
WBC # FLD AUTO: 7.57 K/UL — SIGNIFICANT CHANGE UP (ref 3.8–10.5)

## 2022-08-31 PROCEDURE — 36415 COLL VENOUS BLD VENIPUNCTURE: CPT

## 2022-08-31 PROCEDURE — 96360 HYDRATION IV INFUSION INIT: CPT | Mod: XU

## 2022-08-31 PROCEDURE — 74177 CT ABD & PELVIS W/CONTRAST: CPT | Mod: 26,MA

## 2022-08-31 PROCEDURE — 99284 EMERGENCY DEPT VISIT MOD MDM: CPT | Mod: 25

## 2022-08-31 PROCEDURE — 80053 COMPREHEN METABOLIC PANEL: CPT

## 2022-08-31 PROCEDURE — 85025 COMPLETE CBC W/AUTO DIFF WBC: CPT

## 2022-08-31 PROCEDURE — 99285 EMERGENCY DEPT VISIT HI MDM: CPT

## 2022-08-31 PROCEDURE — 74177 CT ABD & PELVIS W/CONTRAST: CPT | Mod: MA

## 2022-08-31 RX ORDER — SODIUM CHLORIDE 9 MG/ML
1000 INJECTION INTRAMUSCULAR; INTRAVENOUS; SUBCUTANEOUS ONCE
Refills: 0 | Status: COMPLETED | OUTPATIENT
Start: 2022-08-31 | End: 2022-08-31

## 2022-08-31 RX ADMIN — SODIUM CHLORIDE 1000 MILLILITER(S): 9 INJECTION INTRAMUSCULAR; INTRAVENOUS; SUBCUTANEOUS at 09:27

## 2022-08-31 RX ADMIN — SODIUM CHLORIDE 1000 MILLILITER(S): 9 INJECTION INTRAMUSCULAR; INTRAVENOUS; SUBCUTANEOUS at 10:54

## 2022-08-31 NOTE — ED PROVIDER NOTE - OBJECTIVE STATEMENT
LLQ abd pain this am.  Similar to prior to diverticulitis in March 2021.   No n/v/d, fever or gu complaints.  Last normal bm this am.  No other complaints.

## 2022-08-31 NOTE — ED ADULT NURSE NOTE - OBJECTIVE STATEMENT
pt comes to ED c/o LLQ abd pain since this morning, hx of diverticulitis similar pain since last time in march. pt denies fevers, chills, n/v/d, urinary complains, diarrhea, constipation. pt denies all other complaints.

## 2022-08-31 NOTE — ED PROVIDER NOTE - NPI NUMBER (FOR SYSADMIN USE ONLY) :
Cardiology Daily Progress Note- DOS 3/20/21    Assessment and Plan    81 yoM s/p TAVR for AS on DAPT, s/p R MCA CVA, s/p tracheostomy found to have Hgb 6.8 receiving PRBCs, awaiting LTACH placement.    --> Supportive care.  --> Follow CBC.      SUBJECTIVE I have independently seen and examined the patient 3/20/21.  Patient has a tracheostomy.  Awake, eyes open.  INAD.  No signficant telemetry events.    Review of Systems   Unable to perform ROS: Intubated     Objective   Physical Exam  Vitals signs and nursing note reviewed.   Constitutional:       General: He is not in acute distress.     Appearance: He is ill-appearing. He is not toxic-appearing or diaphoretic.      Interventions: He is intubated.   HENT:      Head: Normocephalic and atraumatic.   Eyes:      General:         Right eye: No discharge.         Left eye: No discharge.      Conjunctiva/sclera: Conjunctivae normal.   Neck:      Musculoskeletal: Normal range of motion.   Cardiovascular:      Rate and Rhythm: Normal rate and regular rhythm.      Heart sounds: Normal heart sounds, S1 normal and S2 normal.   Pulmonary:      Effort: No respiratory distress. He is intubated.      Breath sounds: Normal breath sounds.   Musculoskeletal:         General: No swelling.   Skin:     General: Skin is warm and dry.        Vital signs in last 24 hours:  Temp:  [96.6 °F (35.9 °C)-98.6 °F (37 °C)] 96.6 °F (35.9 °C)  Heart Rate:  [69-99] 72  Resp:  [12-23] 17  BP: ()/(29-84) 135/47  FiO2 (%):  [30 %] 30 %  Weight change:     Support  FiO2 (%):  [30 %] 30 %  S RR:  [12] 12  S VT:  [550 mL] 550 mL  PEEP/CPAP/EPAP:  [5 cm H20] 5 cm H20    Intake/Output last 3 shifts:  I/O last 3 completed shifts:  In: 871.9 [I.V.:22.5; Other:50; NG/GT:695; IV Piggyback:104.4]  Out: 1015 [Urine:750; Drains:15; Stool:250]  Intake/Output this shift:  I/O this shift:  In: 409 [Blood:279; NG/GT:130]  Out: 325 [Urine:325]      Medications Scheduled-  Reviewed.       [[id number]]

## 2022-08-31 NOTE — ED PROVIDER NOTE - NONTENDER LOCATION
left upper quadrant/right upper quadrant/left lower quadrant/right lower quadrant/left costovertebral angle/right costovertebral angle

## 2022-08-31 NOTE — ED PROVIDER NOTE - CARE PROVIDER_API CALL
Eugene Mckeon ()  Internal Medicine  [address] [place] [address]  Phone: [phone]  Fax: [phone]  Follow Up Time: 1-3 Days

## 2022-08-31 NOTE — ED ADULT TRIAGE NOTE - ARRIVAL FROM
ASSUMED CARE OF PT AT 0700. ASSESSMENT COMPLETED. A&O,X4. HX ANXIETY, ANXIOUS
AT TIMES, ANTIANXIETY GIVEN AS ORDERED. C/O CHRONIC PAIN AND ABD PIAN, PAIN
MEDS GIVEN AS ORDERED. SOME NAUSEA NOTED, MEDS GIVEN AS ORDERED, NO VOMITING.
ROOM AIR, CLEAR LUNG SOUNDS, NO SOA. REGULAR HEART SOUNDS. ACTIVE BOWEL
SOUNDS, LBM 3/12. SKIN INTACT. WIFE AND THERAPY DOG AT BEDSIDE. POTENTIAL D/C
HOME TODAY. WILL CONTINUE TO MONITOR. Home

## 2022-08-31 NOTE — ED PROVIDER NOTE - PATIENT PORTAL LINK FT
You can access the FollowMyHealth Patient Portal offered by [facility] by registering at the following website: [email or web address]. By joining [employer]’s FollowMyHealth portal, you will also be able to view your health information using other applications (apps) compatible with our system.

## 2022-08-31 NOTE — ED PROVIDER NOTE - NSICDXPASTSURGICALHX_GEN_ALL_CORE_FT
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 Possible Fascia Irina Graft Possible Abdominal Fat Graft  POSSIBLE INSERTION, DRAIN, SPINE, LUMBAR  Surgeon: Surgeon(s) and Role:     * Rolf Neely MD - Primary     * Jo-Ann Green MD - Assisting     * Esequiel Peguero MD - Assisting     * Ben Montoya MD - Resident - Assisting  Anesthesia: General   Estimated Blood Loss: 1000cc    Drains: None  Specimens:   ID Type Source Tests Collected by Time Destination   1 : Cavernous sinus tumor Tissue Brain SURGICAL PATHOLOGY EXAM Rolf Neely MD 6/2/2022  2:23 PM      Findings:  Dense fibrous tumor that was adherent to all cavernous structures. Subtotal resection achieved. Small branch of cavernous segment of carotid avulsed and packed.  Complications: None.  Implants: * No implants in log *        
PAST SURGICAL HISTORY:  Anal fistula fistulotomy    H/O hernia repair     H/O lithotripsy     History of cholecystectomy     S/P cystoscopy with stent placement /removal 2014

## 2022-09-01 NOTE — ED ADULT NURSE NOTE - DOES PATIENT HAVE ADVANCE DIRECTIVE
Impression: Age-related nuclear cataract, right eye: H25.11. Plan: Advised patient of condition. Risks, benefits, and alternatives of cataract surgery discussed. Patient elects to proceed Patient elects to proceed with 2nd eye cataract extraction, OD. Patient elects to proceed with STD procedure with a STD  IOL, target distance.
Impression: S/P Cataract Extraction by phacoemulsification with IOL placement OS - 7 Days. Encounter for surgical aftercare following surgery on a sense organ  Z48.810. Excellent post op course   Post operative instructions reviewed - Condition is improving - Plan: Continue drops as instructed.  --Continue Prednisolone acetate 1%--Continue Ofloxacin 0.3%
No

## 2022-12-29 NOTE — ED ADULT NURSE NOTE - ISOLATION TYPE:
Call received from patient  Reason for Call: has been having on/off itching around face/eye  Has been ongoing for a long time  Reports cream and powder prescribed by PCP are not working  Has not taken allergy meds previously  Will be traveling in 2 weeks and would like to know what he is allergic to prior  Interested in undergoing allergy testing  Currently has rash going on    PCP: please advise on allergy testing - I did advise patient that he may need to try allergy medication prior to testing or have appointment with you       Pharmacist Tracking Tool  Reason For Outreach: Embedded Pharmacist  Demographics:  Intervention Method: Phone  Type of Intervention: New  Topics Addressed: Other  Pharmacologic Interventions: N/A  Non-Pharmacologic Interventions: Care coordination  Time:  Direct Patient Care: 5 mins  Care Coordination: 5 mins  Recommendation Recipient: N/A  Outcome: N/A None

## 2023-04-10 NOTE — ASU PREOP CHECKLIST - WARM FLUIDS/WARM BLANKETS
no Ndc (300 Mg Prefilled Pen): [address]-73 Use Enhanced Ndc?: Yes Hide Non-Enhanced Ndc Variable: No Ndc (300 Mg Prefilled Syringe): [address]-58 Lot # (Optional): [id number] Administered By (Optional): ADDY Hines LVN [id number] Billing Preferences: 1 Syringe Size Used (Required For Enhanced Ndc): 300 mg/2ml prefilled syringe Dupixent Amount: 300 mg Detail Level: None Ndc (200 Mg Prefilled Syringe): [address]-67 Consent: The risks of pain and injection site reactions were reviewed with the patient prior to the injection. J-Code:  Expiration Date (Optional): 01/2025

## 2023-06-27 ENCOUNTER — APPOINTMENT (OUTPATIENT)
Dept: ORTHOPEDIC SURGERY | Facility: CLINIC | Age: 43
End: 2023-06-27
Payer: COMMERCIAL

## 2023-06-27 VITALS
DIASTOLIC BLOOD PRESSURE: 96 MMHG | BODY MASS INDEX: 32.95 KG/M2 | WEIGHT: 205 LBS | SYSTOLIC BLOOD PRESSURE: 144 MMHG | HEIGHT: 66 IN | HEART RATE: 92 BPM

## 2023-06-27 DIAGNOSIS — M54.16 RADICULOPATHY, LUMBAR REGION: ICD-10-CM

## 2023-06-27 DIAGNOSIS — Z78.9 OTHER SPECIFIED HEALTH STATUS: ICD-10-CM

## 2023-06-27 DIAGNOSIS — M70.62 TROCHANTERIC BURSITIS, LEFT HIP: ICD-10-CM

## 2023-06-27 PROCEDURE — 99203 OFFICE O/P NEW LOW 30 MIN: CPT | Mod: 25

## 2023-06-27 PROCEDURE — 73552 X-RAY EXAM OF FEMUR 2/>: CPT | Mod: 26,LT

## 2023-06-27 PROCEDURE — 20610 DRAIN/INJ JOINT/BURSA W/O US: CPT | Mod: LT

## 2023-06-27 RX ORDER — METHYLPREDNISOLONE 4 MG/1
4 TABLET ORAL
Qty: 1 | Refills: 0 | Status: ACTIVE | COMMUNITY
Start: 2023-06-27 | End: 1900-01-01

## 2023-06-29 PROBLEM — Z78.9 NEVER EXERCISES: Status: ACTIVE | Noted: 2023-06-29

## 2023-06-29 NOTE — ADDENDUM
[FreeTextEntry1] : This note was written by Blossom Mcdowell on 06/29/2023 acting as scribe for Antonina Fan, HUGHR/SALVATORE, PA.\par

## 2023-06-29 NOTE — PROCEDURE
[de-identified] : Consent: \par At this time, I have recommended an injection to the left hip.  The risks and benefits of the procedure were discussed with the patient in detail.  Upon verbal consent of the patient, we proceeded with the injection as noted below.  \par \par Indications: Trochanteric bursitis, left hip\par : [employer]par Drug name: Triamcinolone Acetonide Injectable Suspension USP\par NDC#: 0143-9577-10\par Lot#: [id number]\par Expiration date: 01/24\par \par Procedure:\par After a sterile prep, the patient underwent an injection of 9 cc of 1% Lidocaine (10mg/mL) without epinephrine and 1 cc of Kenalog (40 mg/mL) into the left hip.  The patient tolerated the procedure well.  There were no complications.

## 2023-06-29 NOTE — PHYSICAL EXAM
[de-identified] : Right hip:\par Hip: Range of Motion in Degrees:\par 	                                 Claimant:	   Normal:	\par Flexion (Active) 	                 120 	   120-degrees	\par Flexion (Passive)	                 120	   120-degrees	\par Extension (Active)	                 -30	   -30-degrees	\par Extension (Passive)	 -30	   -30-degrees	\par Abduction (Active)	                 45-50	   01-63-xcvmzux	\par Abduction (Passive)	 45-50	   85-35-zuvqevn	\par Adduction (Active)  	 20-30	   44-21-nrrtvok	\par Adduction (Passive)	 20-30	   20-29-bbfvbrz	\par Internal Rotation (Active) 	 35	   35-degrees	\par Internal Rotation (Passive)	 35	   35-degrees	\par External Rotation (Active)	 45	   45-degrees	\par External Rotation (Passive)	 45	   45-degrees	\par \par No tenderness with internal or external rotation or axial load.  No tenderness to palpation over the greater trochanter.  Negative Trendelenburg.  No tenderness with resisted abduction.  No weakness to flexion, extension, abduction or adduction.  No evidence of instability.  No motor or sensory deficits.  2+ DP and PT pulses.  Skin is intact.  No scars, rashes or lesions.  \par  \par Left hip:\par Hip: Range of Motion in Degrees:\par 	                                 Claimant:	          Normal:	\par Flexion (Active) 	                 120 	          120-degrees	\par Flexion (Passive)	                 120	          120-degrees	\par Extension (Active)	                 -30	          -30-degrees	\par Extension (Passive)	 -30	          -30-degrees	\par Abduction (Active)	                45-50	          55-88-vrawshe	\par Abduction (Passive)	45-50	          31-67-pghpekd	\par Adduction (Active)                	20-30	          46-03-ismkdny	\par Adduction (Passive)	20-30	          16-82-ndbjabh	\par Internal Rotation (Active) 	35	          35-degrees	\par Internal Rotation (Passive)	35	          35-degrees	\par External Rotation (Active)	45	          45-degrees	\par External Rotation (Passive)	45	          45-degrees	\par \par No tenderness with internal or external rotation or axial load.  Point tenderness over the greater trochanter with pain with resisted abduction.  Negative Trendelenburg.  No weakness to flexion, extension, abduction or adduction.  No evidence of instability.  No motor or sensory deficits.  2+ DP and PT pulses.  Skin is intact.  No scars, rashes or lesions.  \par  \par Lumbar spine:\par Diffuse lumbar paraspinal spasm. [de-identified] : Gait: Slightly antalgic to antalgic.  Station: Normal  [de-identified] : Appearance: Well-developed, well-nourished male  in no acute distress.  [de-identified] : X-rays taken in the office today, two views of the left femur, reveal no acute fractures or dislocations.

## 2023-06-29 NOTE — HISTORY OF PRESENT ILLNESS
[de-identified] : Ras comes in today with complaints of left hip pain as well as pain going down his left leg.  The patient states the pain is intermittent and radiating.  The patient describes the pain as dull and achy.  The patient states laying and walking make the symptoms better while bending and sitting makes the symptoms worse.  [2] : a current pain level of 2/10 [] : No

## 2023-06-29 NOTE — DISCUSSION/SUMMARY
[de-identified] : The patient presents with lumbar radiculopathy and trochanteric bursitis, left hip.  The patient was given an injection into the left hip as noted above for the trochanteric bursitis.  For the lumbar radiculopathy, he was given a Medrol Dosepak.  He was advised to ice the hip and do a TTM in two weeks.

## 2024-12-30 NOTE — ED PROVIDER NOTE - PMH
[facility]  [address]   [facility] [address]  Dept: [phone]  Dept Fax: [phone]  Loc: [phone]  Resident Note    Patient:Clarence Troncoso Sex: male  YOB: 1955 Age:69 y.o.  MRN: [id number]  Assessment & Plan   1. Fever, unspecified fever cause  2. Acute cough  3. Muscle ache  - POC COVID and Influenza negative in office  - Recommended Tylenol for fever as patient cannot take NSAIDs  Orders  - POCT COVID-19 Rapid, NAAT  - POCT Influenza A/B DNA (Alere i)    4. Acute non-recurrent maxillary sinusitis  - Exam significant for sinus tenderness, congestion, and fevers (up to 101.2) of 7 days   - Patient did call the [facility] prior to this appointment who recommended he see his physician. Patient to update Miguel on visit  - Sending Augmentin. Recommended taking with yogurt as patient has had some abdominal upset since immunotherapy - side effect of Tremelimumab can be GI upset and side effect of Durvalumab can be cough, pneumonia, and URI  Orders  - amoxicillin-clavulanate (AUGMENTIN) 875-125 MG per tablet; Take 1 tablet by mouth 2 times daily for 7 days  Dispense: 14 tablet; Refill: 0       No follow-ups on file.  Future Appointments   Date Time Provider Department Center   2/6/2025 10:00 AM Yamil Solomon MD [facility] DEP   5/8/2025  9:30 AM Yamil Solomon MD [facility] DEP   8/7/2025  9:30 AM Yamil Solomon MD [facility] DEP     History of Present Illness   Clarence Troncoso is a 69 y.o. male who presents today for:  Chief Complaint   Patient presents with    Cough     Symptoms started on Saturday. Patient had immunotherapy treatment done at the [facility] a week ago today.     Fever     Yesterday started.     Headache    Muscle Pain     Cough + Congestion  - Started Monday of last week. Did have first round of immunotherapy Monday at the [facility] with associated stomach upset which has  Kidney stones

## 2025-01-14 NOTE — ED ADULT TRIAGE NOTE - GLASGOW COMA SCALE: BEST MOTOR RESPONSE, MLM
"January 15, 2025             [facility]  [address]  [facility] [address]  Phone: [phone]  January 15, 2025     Patient: Mateo George (Lionel)     YOB: 1978        To Whom It May Concern:    Mateo George was admitted to the hospital on 1/14/2025  1:04 PM and discharged on  1/15/2025 .  He may return to work on 1/16/2025 .  If you have any questions or concerns, or if I can be of any further assistance, please do not hesitate to contact me.    Sincerely,    Thomas Winston, DO  Department of Hospital Medicine     "
(M6) obeys commands

## 2025-05-05 NOTE — ED ADULT NURSE NOTE - AREA
Obstructive sleep apnea      Obstructive sleep apnea (WIL) is a condition that makes it hard for you to breathe when you  sleep. People with WIL often experience the following:   Snoring or making gasping noises when they sleep   Are tired when they wake up or during the day, even if sleep all night   Waking up with headaches   Having trouble focusing on tasks    Next Steps  A sleep test is required to diagnose sleep apnea. People of all ages can have it. WIL is  most common among men older than age 40; women older than age 50; those who are  overweight; people with high blood pressure; men with a neck size greater than 17 inches; and  women with a neck size greater than 16 inches.    Risks  If WIL goes untreated, the long-term health risks can include:   High blood pressure   Heart attack   Stroke   Pre-diabetes/Diabetes   Depression    Treatments  There are a variety of treatment options for WIL patients. Continuous positive airway  pressure, or CPAP for short, lets a stream of air flow from a machine, through tubing to a mask  you wear while you sleep. This flow of air keeps your throat open so that you can breathe  freely and not snore. Another option might be an oral appliance, a mouth guard that holds  your bottom jaw forward and keeps your tongue from blocking your airway while you sleep. If  neither of these options are right for you, your advanced practice clinician will work with you  to find a treatment option will meet your needs.             10 Tips For A Good Night's Sleep    Establish and maintain a regular bedtime and a regular arising time. Try to maintain a regular arising time, even if you have had trouble sleeping the night before.  Reserve your bedroom for sleep. Do not eat in bed. For some people, reading or watching TV in bed helps them to fall asleep faster, for other people, this may delay falling asleep. Avoid using smart phones and tablets in bed.   Exercising regularly and in moderation  tends to deepen sleep. The best time of day to exercise is the late afternoon. Avoid vigorous exercise within two hours of bedtime.   Minimize noise, light, and excessive temperature during the sleep period with a masking noise, window blinds, or an electric blanket/air conditioner.   A light snack may be sleeping inducing, but a heavy meal close to bedtime interferes with sleep.   Caffeine is a stimulant and it disturbs sleep even in those who feel it does not.  Nicotine is also a stimulant and should be avoided near bedtime and upon night awakenings.  Alcohol helps tense people fall asleep more easily, but it causes awakenings later in the night.  People who feel angry and frustrated because they cannot sleep should NOT try harder to sleep but should turn on the light and do something different (read, do a crossword puzzle, watch TV) until drowsy. Then try again.   An occasional sleeping pill may be of some benefit, but chronic use is not advised.      Over-The-Counter & Remedies for Insomnia:  Melatonin -0.3mg-5mg half an hour before bed (sublingual- to get to sleep) or time released (to help stay sleep)  5-HTP-take 100 to 200mg a half hr before bed--DONOT use if on depression medications/antidepressants  CHENCHO take 250-500mg half hour before bed- helps getting to sleep  L-Theanine 200-500mg half hour before bed-calms the brain to get deeper sleep  Calcium and Magnesium- 500mg of calcium and 250mg of magnesium each evening-helps relax the nervous system.   Coffea Cruda- use twice daily for two weeks then stop-helps with racing thoughts/overstimulations.  Aconitum Napellus-helps with restlessness, fear, and panic (for those who experience a terrifying situation only) use twice daily for two weeks then stop  Unisom   Diphenhydramine (Benadryl)  Sleepy Time Tea  Herbal products: chamomile, kava, L-tryptophan, passion flower, coenzyme Q10, lavender, lemon balm, valerian root -  2 tabs a day.    lower

## (undated) DEVICE — WARMING BLANKET UPPER ADULT

## (undated) DEVICE — CATH IV SAFE BC 18G X 1.16" (GREEN)

## (undated) DEVICE — TAPE SILK 3"

## (undated) DEVICE — DRAPE INSTRUMENT POUCH 6.75" X 11"

## (undated) DEVICE — SUT CHROMIC 3-0 30" V-20

## (undated) DEVICE — PREP BETADINE SPONGE STICKS

## (undated) DEVICE — DRSG TAPE MEDIPORE 3"

## (undated) DEVICE — MEDICATION LABELS W MARKER

## (undated) DEVICE — VENODYNE/SCD SLEEVE CALF MEDIUM

## (undated) DEVICE — POSITIONER PATIENT SAFETY STRAP 3X60"

## (undated) DEVICE — SUT SILK 0 18" TIES

## (undated) DEVICE — SOL IRR POUR NS 0.9% 500ML

## (undated) DEVICE — SOL IRR POUR H2O 250ML

## (undated) DEVICE — DRSG COMBINE 5X9"

## (undated) DEVICE — POSITIONER FOAM EGG CRATE ULNAR 2PCS (PINK)

## (undated) DEVICE — SPECIMEN CONTAINER 100ML

## (undated) DEVICE — GLV 7.5 PROTEXIS (WHITE)

## (undated) DEVICE — DRSG TELFA 3 X 8

## (undated) DEVICE — DRAPE TOWEL BLUE 17" X 24"

## (undated) DEVICE — CATH IV SAFE INSYTE 14G X 1.75" (ORANGE)

## (undated) DEVICE — DRAPE THYROID 77" X 123"

## (undated) DEVICE — VESSEL LOOP ASPEN MAXI RED

## (undated) DEVICE — GLV 7.5 PROTEXIS (BLUE)

## (undated) DEVICE — LUBRICATING JELLY ONESHOT 1.25OZ

## (undated) DEVICE — SUT POLYSORB 3-0 30" V-20 UNDYED

## (undated) DEVICE — PACK MINOR